# Patient Record
Sex: MALE | Race: WHITE | NOT HISPANIC OR LATINO | Employment: FULL TIME | URBAN - METROPOLITAN AREA
[De-identification: names, ages, dates, MRNs, and addresses within clinical notes are randomized per-mention and may not be internally consistent; named-entity substitution may affect disease eponyms.]

---

## 2022-06-09 ENCOUNTER — TELEMEDICINE (OUTPATIENT)
Dept: INTERNAL MEDICINE CLINIC | Age: 52
End: 2022-06-09
Payer: COMMERCIAL

## 2022-06-09 VITALS — BODY MASS INDEX: 33.13 KG/M2 | WEIGHT: 250 LBS | HEIGHT: 73 IN

## 2022-06-09 DIAGNOSIS — Z20.822 EXPOSURE TO COVID-19 VIRUS: ICD-10-CM

## 2022-06-09 DIAGNOSIS — R05.9 COUGH: Primary | ICD-10-CM

## 2022-06-09 PROCEDURE — U0003 INFECTIOUS AGENT DETECTION BY NUCLEIC ACID (DNA OR RNA); SEVERE ACUTE RESPIRATORY SYNDROME CORONAVIRUS 2 (SARS-COV-2) (CORONAVIRUS DISEASE [COVID-19]), AMPLIFIED PROBE TECHNIQUE, MAKING USE OF HIGH THROUGHPUT TECHNOLOGIES AS DESCRIBED BY CMS-2020-01-R: HCPCS | Performed by: PHYSICIAN ASSISTANT

## 2022-06-09 PROCEDURE — 99213 OFFICE O/P EST LOW 20 MIN: CPT | Performed by: PHYSICIAN ASSISTANT

## 2022-06-09 PROCEDURE — 3008F BODY MASS INDEX DOCD: CPT | Performed by: PHYSICIAN ASSISTANT

## 2022-06-09 PROCEDURE — 1036F TOBACCO NON-USER: CPT | Performed by: PHYSICIAN ASSISTANT

## 2022-06-09 PROCEDURE — U0005 INFEC AGEN DETEC AMPLI PROBE: HCPCS | Performed by: PHYSICIAN ASSISTANT

## 2022-06-09 RX ORDER — BENZONATATE 100 MG/1
100 CAPSULE ORAL 3 TIMES DAILY PRN
Qty: 20 CAPSULE | Refills: 0 | Status: SHIPPED | OUTPATIENT
Start: 2022-06-09

## 2022-06-09 RX ORDER — ALBUTEROL SULFATE 90 UG/1
2 AEROSOL, METERED RESPIRATORY (INHALATION) EVERY 6 HOURS PRN
Qty: 18 G | Refills: 0 | Status: SHIPPED | OUTPATIENT
Start: 2022-06-09

## 2022-06-09 NOTE — PROGRESS NOTES
COVID-19 Outpatient Progress Note    Assessment/Plan:    Problem List Items Addressed This Visit    None     Visit Diagnoses     Cough    -  Primary    albuterol prn, tessalon prn     Relevant Medications    benzonatate (TESSALON PERLES) 100 mg capsule    albuterol (ProAir HFA) 90 mcg/act inhaler    Exposure to COVID-19 virus        to go for covid testing asap  mask precautions  increase fluids    Relevant Medications    albuterol (ProAir HFA) 90 mcg/act inhaler    Other Relevant Orders    COVID Only- Collected at [facility] or [facility] (Completed)        wash hands liberally with soap and water for at least 20 seconds at a time and wear a face mask when in the presence of any other person  if symptoms persist or worsen pt should call the office or go to the ED for worsening SOB, chest pain, fever or an inability to tolerate oral intake      Disposition:     Referred patient to centralized site to test for COVID-19  Discussed symptom directed medication options with patient  Discussed vitamin D, vitamin C, and/or zinc supplementation with patient  Patient meets criteria for PAXLOVID and they have been counseled appropriately according to EUA documentation released by the FDA  After discussion, patient agrees to treatment  [address] is an investigational medicine used to treat mild-to-moderate COVID-19 in adults and children (15years of age and older weighing at least 80 pounds (40 kg)) with positive results of direct SARS-CoV-2 viral testing, and who are at high risk for progression to severe COVID-19, including hospitalization or death  PAXLOVID is investigational because it is still being studied  There is limited information about the safety and effectiveness of using PAXLOVID to treat people with mild-to-moderate COVID-19      The FDA has authorized the emergency use of PAXLOVID for the treatment of mild-tomoderate COVID-19 in adults and children (15years of age and older weighing at least 80 pounds (40 kg)) with a positive test for the virus that causes COVID-19, and who are at high risk for progression to severe COVID-19, including hospitalization or death, under an EUA  What should I tell my healthcare provider before I take PAXLOVID? Tell your healthcare provider if you:  - Have any allergies  - Have liver or kidney disease  - Are pregnant or plan to become pregnant  - Are breastfeeding a child  - Have any serious illnesses    Tell your healthcare provider about all the medicines you take, including prescription and over-the-counter medicines, vitamins, and herbal supplements  Some medicines may interact with PAXLOVID and may cause serious side effects  Keep a list of your medicines to show your healthcare provider and pharmacist when you get a new medicine  You can ask your healthcare provider or pharmacist for a list of medicines that interact with PAXLOVID  Do not start taking a new medicine without telling your healthcare provider  Your healthcare provider can tell you if it is safe to take PAXLOVID with other medicines  Tell your healthcare provider if you are taking combined hormonal contraceptive  PAXLOVID may affect how your birth control pills work  Females who are able to become pregnant should use another effective alternative form of contraception or an additional barrier method of contraception  Talk to your healthcare provider if you have any questions about contraceptive methods that might be right for you  How do I take PAXLOVID? PAXLOVID consists of 2 medicines: nirmatrelvir and ritonavir  - Take 2 pink tablets of nirmatrelvir with 1 white tablet of ritonavir by mouth 2 times each day (in the morning and in the evening) for 5 days  For each dose, take all 3 tablets at the same time  - If you have kidney disease, talk to your healthcare provider  You may need a different dose  - Swallow the tablets whole   Do not chew, break, or crush the tablets  - Take PAXLOVID with or without food  - Do not stop taking PAXLOVID without talking to your healthcare provider, even if you feel better  - If you miss a dose of PAXLOVID within 8 hours of the time it is usually taken, take it as soon as you remember  If you miss a dose by more than 8 hours, skip the missed dose and take the next dose at your regular time  Do not take 2 doses of PAXLOVID at the same time  - If you take too much PAXLOVID, call your healthcare provider or go to the nearest hospital emergency room right away  - If you are taking a ritonavir- or cobicistat-containing medicine to treat hepatitis C or Human Immunodeficiency Virus (HIV), you should continue to take your medicine as prescribed by your healthcare provider   - Talk to your healthcare provider if you do not feel better or if you feel worse after 5 days  Who should generally not take PAXLOVID? Do not take PAXLOVID if:  You are allergic to nirmatrelvir, ritonavir, or any of the ingredients in PAXLOVID  You are taking any of the following medicines:  - Alfuzosin  - Pethidine, piroxicam, propoxyphene  - Ranolazine  - Amiodarone, dronedarone, flecainide, propafenone, quinidine  - Colchicine  - Lurasidone, pimozide, clozapine  - Dihydroergotamine, ergotamine, methylergonovine  - Lovastatin, simvastatin  - Sildenafil (Revatio®) for pulmonary arterial hypertension (PAH)  - Triazolam, oral midazolam  - Apalutamide  - Carbamazepine, phenobarbital, phenytoin  - Rifampin  - St  Lams Wort (hypericum perforatum)    What are the important possible side effects of PAXLOVID? Possible side effects of PAXLOVID are:  - Liver Problems  Tell your healthcare provider right away if you have any of these signs and symptoms of liver problems: loss of appetite, yellowing of your skin and the whites of eyes (jaundice), dark-colored urine, pale colored stools and itchy skin, stomach area (abdominal) pain  - Resistance to HIV Medicines   If you have untreated HIV infection, PAXLOVID may lead to some HIV medicines not working as well in the future  - Other possible side effects include: altered sense of taste, diarrhea, high blood pressure, or muscle aches    These are not all the possible side effects of PAXLOVID  Not many people have taken PAXLOVID  Serious and unexpected side effects may happen  [address] is still being studied, so it is possible that all of the risks are not known at this time  What other treatment choices are there? Like Bhavana Fail may allow for the emergency use of other medicines to treat people with COVID-19  Go to https://[employer]/ for information on the emergency use of other medicines that are authorized by FDA to treat people with COVID-19  Your healthcare provider may talk with you about clinical trials for which you may be eligible  It is your choice to be treated or not to be treated with PAXLOVID  Should you decide not to receive it or for your child not to receive it, it will not change your standard medical care  What if I am pregnant or breastfeeding? There is no experience treating pregnant women or breastfeeding mothers with PAXLOVID  For a mother and unborn baby, the benefit of taking PAXLOVID may be greater than the risk from the treatment  If you are pregnant, discuss your options and specific situation with your healthcare provider  It is recommended that you use effective barrier contraception or do not have sexual activity while taking PAXLOVID  If you are breastfeeding, discuss your options and specific situation with your healthcare provider  How do I report side effects with PAXLOVID? Contact your healthcare provider if you have any side effects that bother you or do not go away      Report side effects to FDA MedWatch at www fda gov/medwatch or call 4-326-UBE9317 or you can report side effects to [employer]  at the contact information provided below  Website Fax number Telephone number   [employer] [phone] [phone]     How should I store [address]? Store PAXLOVID tablets at room temperature between 68°F to 77°F (20°C to 25°C)  Full fact sheet for patients, parents, and caregivers can be found at: [employer]    I have spent 12 minutes directly with the patient  Greater than 50% of this time was spent in counseling/coordination of care regarding: risks and benefits of treatment options, instructions for management and patient and family education  To go for covid testing asap         Encounter provider Feli Pozo PA-C    Provider located at [address] [id number]    Recent Visits  Date Type Provider Dept   06/09/22 Telemedicine Feli Pozo PA-C [id number] [facility] recent visits within past 7 days and meeting all other requirements  Today's Visits  Date Type Provider Dept   06/10/22 Telephone [facility]   Showing today's visits and meeting all other requirements  Future Appointments  No visits were found meeting these conditions  Showing future appointments within next 150 days and meeting all other requirements       Patient agrees to participate in a virtual check in via telephone or video visit instead of presenting to the office to address urgent/immediate medical needs  Patient is aware this is a billable service  After connecting through [facility], the patient was identified by name and date of birth  William Alfred was informed that this was a telemedicine visit and that the exam was being conducted confidentially over secure lines  My office door was closed  No one else was in the room   William Alfred acknowledged consent and understanding of privacy and security of the telemedicine visit  I informed the patient that I have reviewed his record in Epic and presented the opportunity for him to ask any questions regarding the visit today  The patient agreed to participate  Verification of patient location:  Patient is located in the following state in which I hold an active license: [place]    Subjective:   Radha Bain is a 54 y o  male who is concerned about COVID-19  Patient's symptoms include sore throat, cough, shortness of breath and chest tightness  Patient denies fever, chills, fatigue, malaise, congestion, rhinorrhea, anosmia, loss of taste, abdominal pain, nausea, vomiting, diarrhea, myalgias and headaches  - Date of symptom onset: 6/8/2022      COVID-19 vaccination status: Not vaccinated    Exposure:   Contact with a person who is under investigation (PUI) for or who is positive for COVID-19 within the last 14 days?: No    Hospitalized recently for fever and/or lower respiratory symptoms?: No      Currently a healthcare worker that is involved in direct patient care?: No      Works in a special setting where the risk of COVID-19 transmission may be high? (this may include long-term care, correctional and half-way facilities; homeless shelters; assisted-living facilities and group homes ): No      Resident in a special setting where the risk of COVID-19 transmission may be high? (this may include long-term care, correctional and half-way facilities; homeless shelters; assisted-living facilities and group homes ): No      Sx started this am suddenly, pt reports chest tightness, sob with exertion, dry cough, sore throat  No known contacts      Lab Results   Component Value Date    SARSCOV2 Positive (A) 06/09/2022    [facility] [address] [facility] Not Detected 09/07/2020     History reviewed  No pertinent past medical history    Past Surgical History:   Procedure Laterality Date    HERNIA REPAIR       Current Outpatient Medications   Medication Sig Dispense Refill    albuterol (ProAir HFA) 90 mcg/act inhaler Inhale 2 puffs every 6 (six) hours as needed for wheezing or shortness of breath 18 g 0    benzonatate (TESSALON PERLES) 100 mg capsule Take 1 capsule (100 mg total) by mouth 3 (three) times a day as needed for cough 20 capsule 0    nirmatrelvir & ritonavir (Paxlovid) tablet therapy pack Take 3 tablets by mouth 2 (two) times a day for 5 days Take 2 nirmatrelvir tablets + 1 ritonavir tablet together per dose 30 tablet 0     No current facility-administered medications for this visit  No Known Allergies    Review of Systems   Constitutional: Negative for chills, fatigue and fever  HENT: Positive for sore throat  Negative for congestion and rhinorrhea  Respiratory: Positive for cough, chest tightness and shortness of breath  Gastrointestinal: Negative for abdominal pain, diarrhea, nausea and vomiting  Musculoskeletal: Negative for myalgias  Neurological: Negative for headaches  Objective:    Vitals:    06/09/22 0702   Weight: 113 kg (250 lb)   Height: 6' 1" (1 854 m)       Physical Exam  Constitutional:       General: He is not in acute distress  Pulmonary:      Effort: Pulmonary effort is normal  No respiratory distress  Neurological:      General: No focal deficit present  Mental Status: He is alert  VIRTUAL VISIT DISCLAIMER    Vickie Hernandez verbally agrees to participate in [employer]  Pt is aware that [employer] could be limited without vital signs or the ability to perform a full hands-on physical Morteza Sanchez understands he or the provider may request at any time to terminate the video visit and request the patient to seek care or treatment in person

## 2022-06-10 ENCOUNTER — TELEPHONE (OUTPATIENT)
Dept: INTERNAL MEDICINE CLINIC | Age: 52
End: 2022-06-10

## 2022-06-10 DIAGNOSIS — U07.1 COVID-19: Primary | ICD-10-CM

## 2022-06-10 LAB — SARS-COV-2 RNA RESP QL NAA+PROBE: POSITIVE

## 2022-06-10 NOTE — TELEPHONE ENCOUNTER
Patient is calling to find out if we can send over the medication for him to take for COVID    His test results came back positive for COVID    Please send to [facility] in [place]

## 2022-08-31 ENCOUNTER — APPOINTMENT (OUTPATIENT)
Dept: RADIOLOGY | Facility: CLINIC | Age: 52
End: 2022-08-31
Payer: COMMERCIAL

## 2022-08-31 ENCOUNTER — OFFICE VISIT (OUTPATIENT)
Dept: INTERNAL MEDICINE CLINIC | Age: 52
End: 2022-08-31
Payer: COMMERCIAL

## 2022-08-31 VITALS
WEIGHT: 250 LBS | TEMPERATURE: 98.5 F | SYSTOLIC BLOOD PRESSURE: 140 MMHG | OXYGEN SATURATION: 98 % | HEIGHT: 73 IN | BODY MASS INDEX: 33.13 KG/M2 | DIASTOLIC BLOOD PRESSURE: 100 MMHG | HEART RATE: 100 BPM

## 2022-08-31 DIAGNOSIS — Z12.11 SCREEN FOR COLON CANCER: Primary | ICD-10-CM

## 2022-08-31 DIAGNOSIS — M25.562 ACUTE PAIN OF LEFT KNEE: ICD-10-CM

## 2022-08-31 PROCEDURE — 73562 X-RAY EXAM OF KNEE 3: CPT

## 2022-08-31 PROCEDURE — 99202 OFFICE O/P NEW SF 15 MIN: CPT | Performed by: NURSE PRACTITIONER

## 2022-08-31 RX ORDER — MELOXICAM 15 MG/1
15 TABLET ORAL DAILY
Qty: 30 TABLET | Refills: 0 | Status: SHIPPED | OUTPATIENT
Start: 2022-08-31

## 2022-08-31 RX ORDER — TRAMADOL HYDROCHLORIDE 50 MG/1
50 TABLET ORAL
Qty: 7 TABLET | Refills: 0 | Status: SHIPPED | OUTPATIENT
Start: 2022-08-31

## 2022-08-31 NOTE — PROGRESS NOTES
Assessment/Plan:    Acute pain of left knee  Will start mobic, advised not to take with other NSAIDs  Tramadol for severe pain  Will get x-rays of bilateral knees  Referral given to orthopedics  May need PT  Diagnoses and all orders for this visit:    Screen for colon cancer  -     Cologuard    Acute pain of left knee  -     XR knee 3 vw left non injury; Future  -     XR knee 3 vw right non injury; Future  -     traMADol (Ultram) 50 mg tablet; Take 1 tablet (50 mg total) by mouth daily at bedtime as needed for severe pain  -     meloxicam (Mobic) 15 mg tablet; Take 1 tablet (15 mg total) by mouth daily  -     Ambulatory Referral to Orthopedic Surgery; Future          Subjective:      Patient ID: Yolanda Rooney is a 54 y o  male  Patient presents today with significant right knee pain, denies injury  Has been going on for about a month  He is very active   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  He reports that he wakes up a night from the pain at times  Also now having left knee pain from over compensating     Knee Pain   There was no injury mechanism  The pain is at a severity of 10/10  The pain has been intermittent since onset  Associated symptoms include numbness and tingling  Pertinent negatives include no inability to bear weight or muscle weakness  He has tried NSAIDs (bio freeze, voltaren gel ) for the symptoms  The treatment provided no relief  The following portions of the patient's history were reviewed and updated as appropriate: allergies, current medications, past family history, past medical history, past social history, past surgical history and problem list     Review of Systems   Constitutional: Negative for activity change, appetite change, chills, diaphoresis and fever  HENT: Negative for congestion, ear discharge, ear pain, postnasal drip, rhinorrhea, sinus pressure, sinus pain and sore throat      Eyes: Negative for pain, discharge, itching and visual disturbance  Respiratory: Negative for cough, chest tightness, shortness of breath and wheezing  Cardiovascular: Negative for chest pain, palpitations and leg swelling  Gastrointestinal: Negative for abdominal pain, constipation, diarrhea, nausea and vomiting  Endocrine: Negative for polydipsia, polyphagia and polyuria  Genitourinary: Negative for difficulty urinating, dysuria and urgency  Musculoskeletal: Positive for arthralgias  Negative for back pain and neck pain  Skin: Negative for rash and wound  Neurological: Positive for tingling and numbness  Negative for dizziness, weakness and headaches  Past Medical History:   Diagnosis Date    Hypertension          Current Outpatient Medications:     albuterol (ProAir HFA) 90 mcg/act inhaler, Inhale 2 puffs every 6 (six) hours as needed for wheezing or shortness of breath, Disp: 18 g, Rfl: 0    meloxicam (Mobic) 15 mg tablet, Take 1 tablet (15 mg total) by mouth daily, Disp: 30 tablet, Rfl: 0    traMADol (Ultram) 50 mg tablet, Take 1 tablet (50 mg total) by mouth daily at bedtime as needed for severe pain, Disp: 7 tablet, Rfl: 0    benzonatate (TESSALON PERLES) 100 mg capsule, Take 1 capsule (100 mg total) by mouth 3 (three) times a day as needed for cough (Patient not taking: Reported on 8/31/2022), Disp: 20 capsule, Rfl: 0    No Known Allergies    Social History   Past Surgical History:   Procedure Laterality Date    HERNIA REPAIR       Family History   Problem Relation Age of Onset    COPD Mother     Hypertension Father     Diabetes Father     Lung cancer Father        Objective:  /100 (BP Location: Left arm, Patient Position: Sitting, Cuff Size: Large)   Pulse 100   Temp 98 5 °F (36 9 °C) (Temporal)   Ht 6' 1" (1 854 m)   Wt 113 kg (250 lb)   SpO2 98%   BMI 32 98 kg/m²     No results found for this or any previous visit (from the past 1344 hour(s))           Physical Exam  Constitutional:       General: He is not in acute distress  Appearance: He is well-developed  He is not diaphoretic  HENT:      Head: Normocephalic and atraumatic  Right Ear: External ear normal       Left Ear: External ear normal       Nose: Nose normal       Mouth/Throat:      Pharynx: No oropharyngeal exudate  Eyes:      General:         Right eye: No discharge  Left eye: No discharge  Conjunctiva/sclera: Conjunctivae normal       Pupils: Pupils are equal, round, and reactive to light  Neck:      Thyroid: No thyromegaly  Cardiovascular:      Rate and Rhythm: Normal rate and regular rhythm  Heart sounds: Normal heart sounds  No murmur heard  No friction rub  No gallop  Pulmonary:      Effort: Pulmonary effort is normal  No respiratory distress  Breath sounds: Normal breath sounds  No stridor  No wheezing or rales  Abdominal:      General: Bowel sounds are normal  There is no distension  Palpations: Abdomen is soft  Tenderness: There is no abdominal tenderness  Musculoskeletal:      Cervical back: Normal range of motion and neck supple  Right knee: Swelling present  No erythema or crepitus  Normal range of motion  Tenderness present  Instability Tests: Anterior drawer test negative  Legs:    Lymphadenopathy:      Cervical: No cervical adenopathy  Skin:     General: Skin is warm and dry  Findings: No erythema or rash  Neurological:      Mental Status: He is alert and oriented to person, place, and time  Psychiatric:         Behavior: Behavior normal          Thought Content:  Thought content normal          Judgment: Judgment normal

## 2022-08-31 NOTE — ASSESSMENT & PLAN NOTE
Will start mobic, advised not to take with other NSAIDs  Tramadol for severe pain  Will get x-rays of bilateral knees  Referral given to orthopedics  May need PT

## 2022-09-12 ENCOUNTER — OFFICE VISIT (OUTPATIENT)
Dept: OBGYN CLINIC | Facility: CLINIC | Age: 52
End: 2022-09-12
Payer: COMMERCIAL

## 2022-09-12 VITALS
WEIGHT: 250 LBS | DIASTOLIC BLOOD PRESSURE: 89 MMHG | SYSTOLIC BLOOD PRESSURE: 130 MMHG | HEIGHT: 73 IN | BODY MASS INDEX: 33.13 KG/M2

## 2022-09-12 DIAGNOSIS — M25.562 CHRONIC PAIN OF BOTH KNEES: Primary | ICD-10-CM

## 2022-09-12 DIAGNOSIS — M25.561 CHRONIC PAIN OF BOTH KNEES: Primary | ICD-10-CM

## 2022-09-12 DIAGNOSIS — M25.461 EFFUSION OF BOTH KNEE JOINTS: ICD-10-CM

## 2022-09-12 DIAGNOSIS — M25.462 EFFUSION OF BOTH KNEE JOINTS: ICD-10-CM

## 2022-09-12 DIAGNOSIS — G89.29 CHRONIC PAIN OF BOTH KNEES: Primary | ICD-10-CM
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
## 2022-09-12 DIAGNOSIS — S70.361A TICK BITE OF RIGHT THIGH, INITIAL ENCOUNTER: ICD-10-CM

## 2022-09-12 DIAGNOSIS — M17.0 PRIMARY OSTEOARTHRITIS OF BOTH KNEES: ICD-10-CM

## 2022-09-12 DIAGNOSIS — M25.562 ACUTE PAIN OF LEFT KNEE: ICD-10-CM

## 2022-09-12 LAB — CRYSTALS SNV QL MICRO: NORMAL

## 2022-09-12 PROCEDURE — 87476 LYME DIS DNA AMP PROBE: CPT | Performed by: EMERGENCY MEDICINE

## 2022-09-12 PROCEDURE — 99204 OFFICE O/P NEW MOD 45 MIN: CPT | Performed by: EMERGENCY MEDICINE

## 2022-09-12 PROCEDURE — 87205 SMEAR GRAM STAIN: CPT | Performed by: EMERGENCY MEDICINE

## 2022-09-12 PROCEDURE — 87070 CULTURE OTHR SPECIMN AEROBIC: CPT | Performed by: EMERGENCY MEDICINE

## 2022-09-12 PROCEDURE — 89051 BODY FLUID CELL COUNT: CPT | Performed by: EMERGENCY MEDICINE

## 2022-09-12 PROCEDURE — 20610 DRAIN/INJ JOINT/BURSA W/O US: CPT | Performed by: EMERGENCY MEDICINE

## 2022-09-12 PROCEDURE — 89060 EXAM SYNOVIAL FLUID CRYSTALS: CPT | Performed by: EMERGENCY MEDICINE

## 2022-09-12 RX ORDER — BUPIVACAINE HYDROCHLORIDE 5 MG/ML
3.5 INJECTION, SOLUTION PERINEURAL
Status: COMPLETED | OUTPATIENT
Start: 2022-09-12 | End: 2022-09-12

## 2022-09-12 RX ADMIN — BUPIVACAINE HYDROCHLORIDE 3.5 ML: 5 INJECTION, SOLUTION PERINEURAL at 16:16

## 2022-09-12 NOTE — PROGRESS NOTES
Assessment/Plan:    Diagnoses and all orders for this visit:    Chronic pain of both knees  -     Body fluid culture and Gram stain; Future  -     Synovial fluid white cell count w/ diff; Future  -     Synovial fluid, crystal; Future  -     Lyme disease, PCR; Future  -     Large joint arthrocentesis: R knee    Primary osteoarthritis of both knees  -     Body fluid culture and Gram stain; Future  -     Synovial fluid white cell count w/ diff; Future  -     Synovial fluid, crystal; Future  -     Lyme disease, PCR; Future  -     Large joint arthrocentesis: R knee    Effusion of both knee joints  -     Body fluid culture and Gram stain; Future  -     Synovial fluid white cell count w/ diff; Future  -     Synovial fluid, crystal; Future  -     Lyme disease, PCR; Future  -     Large joint arthrocentesis: R knee    Tick bite, unspecified site, initial encounter  -     Body fluid culture and Gram stain; Future  -     Synovial fluid white cell count w/ diff; Future  -     Synovial fluid, crystal; Future  -     Lyme disease, PCR; Future  -     Large joint arthrocentesis: R knee    Given slightly cloudy synovial fluid appearance, will perform synovial evaluation  If wnl will provide CSI, given arthritic changes seen on Xrays  Continue Mobic  Work excuse note until further notice    Return for as scheduled next week  Chief Complaint:     Chief Complaint   Patient presents with    Left Knee - Pain    Right Knee - Pain       Subjective:   Patient ID: Sumaya He is a 54 y o  male  NP presents for Right medial and anterior/infrapatellar knee pain x 1-2 months, denies injury but does have hx of tick bites and alternating pain and swelling b/l knees  Recent Xrays obtained recently   Denies F/C, taking Mobic        Review of Systems    The following portions of the patient's chart were reviewed and updated as appropriate:    Allergy:  No Known Allergies      Past Medical History:   Diagnosis Date    Hypertension Past Surgical History:   Procedure Laterality Date    HERNIA REPAIR         Social History     Socioeconomic History    Marital status: Single     Spouse name: Not on file    Number of children: Not on file    Years of education: Not on file    Highest education level: Not on file   Occupational History    Not on file   Tobacco Use    Smoking status: Never Smoker    Smokeless tobacco: Current User     Types: Snuff, Chew   Vaping Use    Vaping Use: Never used   Substance and Sexual Activity    Alcohol use: Yes     Comment: socal    Drug use: Never    Sexual activity: Not on file   Other Topics Concern    Not on file   Social History Narrative    Not on file     Social Determinants of Health     Financial Resource Strain: Not on file   Food Insecurity: Not on file   Transportation Needs: Not on file   Physical Activity: Not on file   Stress: Not on file   Social Connections: Not on file   Intimate Partner Violence: Not on file   Housing Stability: Not on file       Family History   Problem Relation Age of Onset    COPD Mother     Hypertension Father     Diabetes Father     Lung cancer Father        Medications:    Current Outpatient Medications:     albuterol (ProAir HFA) 90 mcg/act inhaler, Inhale 2 puffs every 6 (six) hours as needed for wheezing or shortness of breath, Disp: 18 g, Rfl: 0    benzonatate (TESSALON PERLES) 100 mg capsule, Take 1 capsule (100 mg total) by mouth 3 (three) times a day as needed for cough (Patient not taking: Reported on 8/31/2022), Disp: 20 capsule, Rfl: 0    meloxicam (Mobic) 15 mg tablet, Take 1 tablet (15 mg total) by mouth daily, Disp: 30 tablet, Rfl: 0    traMADol (Ultram) 50 mg tablet, Take 1 tablet (50 mg total) by mouth daily at bedtime as needed for severe pain, Disp: 7 tablet, Rfl: 0    Patient Active Problem List   Diagnosis    Acute pain of left knee       Objective:  /89   Ht 6' 1" (1 854 m)   Wt 113 kg (250 lb)   BMI 32 98 kg/m² Right Knee Exam     Other   Erythema: absent  Sensation: normal  Swelling: moderate  Effusion: effusion present      Left Knee Exam     Other   Erythema: absent          Observations     Right Knee   Positive for effusion  Physical Exam  Musculoskeletal:      Right knee: Effusion present  Neurologic Exam    Large joint arthrocentesis: R knee  Universal Protocol:  Consent: Verbal consent obtained  Risks and benefits: risks, benefits and alternatives were discussed  Consent given by: patient  Time out: Immediately prior to procedure a "time out" was called to verify the correct patient, procedure, equipment, support staff and site/side marked as required  Timeout called at: 9/12/2022 4:15 PM   Patient understanding: patient states understanding of the procedure being performed  Test results: test results available and properly labeled  Site marked: the operative site was marked  Patient identity confirmed: verbally with patient    Supporting Documentation  Indications: pain   Procedure Details  Location: knee - R knee  Preparation: Patient was prepped and draped in the usual sterile fashion  Needle size: 18 G  Ultrasound guidance: no  Approach: lateral  Medications administered: 3 5 mL bupivacaine 0 5 %    Aspirate amount: 20 mL  Aspirate: yellow and blood-tinged  Analysis: fluid sample sent for laboratory analysis    Patient tolerance: patient tolerated the procedure well with no immediate complications  Dressing:  Sterile dressing applied    No erythema of knees  Slightly cloudy          I have personally reviewed pertinent films in PACS  and I have personally reviewed the written report of the pertinent studies     Mild to moderate degenerative changes medial compartment L>R with + effusions, no acute fractures or dislocations staff and site/side marked as required  Timeout called at: 9/12/2022 4:15 PM   Patient understanding: patient states understanding of the procedure being performed  Test results: test results available and properly labeled  Site marked: the operative site was marked  Patient identity confirmed: verbally with patient    Supporting Documentation  Indications: pain   Procedure Details  Location: knee - R knee  Preparation: Patient was prepped and draped in the usual sterile fashion  Needle size: 18 G  Ultrasound guidance: no  Approach: lateral  Medications administered: 3 5 mL bupivacaine 0 5 %    Aspirate amount: 20 mL  Aspirate: yellow and blood-tinged  Analysis: fluid sample sent for laboratory analysis    Patient tolerance: patient tolerated the procedure well with no immediate complications  Dressing:  Sterile dressing applied    No erythema of knees  Slightly cloudy          I have personally reviewed pertinent films in PACS  and I have personally reviewed the written report of the pertinent studies     Mild to moderate degenerative changes medial compartment L>R with + effusions, no acute fractures or dislocations

## 2022-09-12 NOTE — LETTER
September 12, 2022     Patient: Clay Gonzalez  YOB: 1970  Date of Visit: 9/12/2022      To Whom it May Concern:    Nancy Ott is under my professional care  Padmaja Shelby was seen in my office on 9/12/2022  Work excuse until further notice  F/U 1 week    If you have any questions or concerns, please don't hesitate to call           Sincerely,          Mardelle Nageotte, MD        CC: No Recipients

## 2022-09-12 NOTE — PATIENT INSTRUCTIONS
While taking meloxicam do not take any other NSAIDs such as Advil, Motrin, ibuprofen, Celebrex, naproxen or Aleve  However you may take Tylenol    You may also take Tylenol 500mg every 4-6 hours as needed OR max 1,000mg per dose up to 3 times per day for a total of 3,000mg per day

## 2022-09-13 LAB
APPEARANCE FLD: ABNORMAL
COLOR FLD: YELLOW
EOSINOPHIL NFR SNV MANUAL: 1 %
LYMPHOCYTES # SNV MANUAL: 14 %
MONOCYTES NFR SNV MANUAL: 83 %
NEUTROPHILS NFR SNV MANUAL: 2 %
SITE: ABNORMAL
TOTAL CELLS COUNTED SPEC: 100
WBC # FLD MANUAL: 226 /UL (ref 0–200)

## 2022-09-15 ENCOUNTER — OFFICE VISIT (OUTPATIENT)
Dept: INTERNAL MEDICINE CLINIC | Age: 52
End: 2022-09-15
Payer: COMMERCIAL

## 2022-09-15 VITALS
HEART RATE: 110 BPM | HEIGHT: 73 IN | SYSTOLIC BLOOD PRESSURE: 148 MMHG | WEIGHT: 252 LBS | TEMPERATURE: 97.9 F | BODY MASS INDEX: 33.4 KG/M2 | OXYGEN SATURATION: 98 % | DIASTOLIC BLOOD PRESSURE: 78 MMHG

## 2022-09-15 DIAGNOSIS — F17.229 CHEWING TOBACCO NICOTINE DEPENDENCE WITH NICOTINE-INDUCED DISORDER: Primary | ICD-10-CM

## 2022-09-15 DIAGNOSIS — H61.23 CERUMEN DEBRIS ON TYMPANIC MEMBRANE OF BOTH EARS: ICD-10-CM

## 2022-09-15 DIAGNOSIS — M25.50 ARTHRALGIA, UNSPECIFIED JOINT: ICD-10-CM

## 2022-09-15 DIAGNOSIS — R53.83 FATIGUE, UNSPECIFIED TYPE: ICD-10-CM

## 2022-09-15 DIAGNOSIS — Z13.228 SCREENING FOR METABOLIC DISORDER: ICD-10-CM

## 2022-09-15 DIAGNOSIS — Z11.59 ENCOUNTER FOR HEPATITIS C SCREENING TEST FOR LOW RISK PATIENT: ICD-10-CM

## 2022-09-15 DIAGNOSIS — Z12.5 PROSTATE CANCER SCREENING: ICD-10-CM

## 2022-09-15 DIAGNOSIS — L30.9 DERMATITIS: ICD-10-CM

## 2022-09-15 PROBLEM — F11.20 CONTINUOUS OPIOID DEPENDENCE (HCC): Status: ACTIVE | Noted: 2022-09-15

## 2022-09-15 PROCEDURE — 69210 REMOVE IMPACTED EAR WAX UNI: CPT | Performed by: PHYSICIAN ASSISTANT

## 2022-09-15 PROCEDURE — 99214 OFFICE O/P EST MOD 30 MIN: CPT | Performed by: PHYSICIAN ASSISTANT

## 2022-09-15 RX ORDER — BUPROPION HYDROCHLORIDE 150 MG/1
150 TABLET, EXTENDED RELEASE ORAL 2 TIMES DAILY
Qty: 60 TABLET | Refills: 2 | Status: SHIPPED | OUTPATIENT
Start: 2022-09-15

## 2022-09-15 NOTE — PATIENT INSTRUCTIONS
Fasting labs to be done after birthday    Check BP at home, send via [facility] wellbutrin 1 tab in am for 1 week then increase to twice daily (am and dinner time)

## 2022-09-15 NOTE — PROGRESS NOTES
Assessment/Plan:         Diagnoses and all orders for this visit:    Chewing tobacco nicotine dependence with nicotine-induced disorder  Comments:  wellbutrin 150mg daily x 1 week then increase to bid  discussed gum chewing alternative  up to date with dental exams and no lesions   Orders:  -     buPROPion (Wellbutrin SR) 150 mg 12 hr tablet; Take 1 tablet (150 mg total) by mouth 2 (two) times a day    Dermatitis  Comments:  consider eval for possible psoriasis  triamcinolone bid x 1 week   Orders:  -     triamcinolone (KENALOG) 0 1 % ointment; Apply topically 2 (two) times a day  -     Lyme Total Antibody Profile with reflex to WB; Future  -     TSH, 3rd generation; Future  -     Uric acid; Future    Arthralgia, unspecified joint  Comments:  f/u with ortho next week   Orders:  -     CBC and differential; Future  -     Comprehensive metabolic panel; Future  -     Lyme Total Antibody Profile with reflex to WB; Future  -     TSH, 3rd generation; Future  -     Uric acid; Future    Screening for metabolic disorder  -     Lipid panel; Future    Fatigue, unspecified type  -     CBC and differential; Future  -     Comprehensive metabolic panel; Future  -     Lyme Total Antibody Profile with reflex to WB; Future  -     TSH, 3rd generation; Future  -     Uric acid; Future    Encounter for hepatitis C screening test for low risk patient  -     Hepatitis C antibody; Future    Prostate cancer screening  -     PSA, Total Screen; Future    Cerumen debris on tympanic membrane of both ears    Other orders  -     Ear cerumen removal        BMI Counseling: Body mass index is 33 25 kg/m²  The BMI is above normal  Nutrition recommendations include encouraging healthy choices of fruits and vegetables, moderation in carbohydrate intake and increasing intake of lean protein  Exercise recommendations include exercising 3-5 times per week  Rationale for BMI follow-up plan is due to patient being overweight or obese           Subjective: Patient ID: Yassine Julio is a 54 y o  male  53 y/o male presents for establish care visit  bp slightly elevated today  States in past he was on lisinopril for BP but stopped on his own   Pt denies caffiene use    Pt with hx of nicotine dependence - chewing tobacco since late teen  Has not tried any cessation products in past  Reg dental visits, denies lesions in past    C/o rash on abdomen - noted 1 week ago, denies itching  +fam hx psoriasis  - father  No other rashes    Cerumen b/l - decreased hearing     R knee pain improved - has f/u with ortho  cx negative, crystals neg, lyme pending  No recent labs      The following portions of the patient's history were reviewed and updated as appropriate: allergies, current medications, past family history, past medical history, past social history, past surgical history and problem list     Review of Systems   Constitutional: Negative for activity change, appetite change, chills, diaphoresis and fever  HENT: Positive for hearing loss  Negative for congestion and dental problem  Eyes: Negative for pain and redness  Respiratory: Negative for cough, shortness of breath and wheezing  Cardiovascular: Negative for chest pain, palpitations and leg swelling  Gastrointestinal: Negative for abdominal pain, constipation, diarrhea and nausea  Genitourinary: Negative for dysuria and frequency  Musculoskeletal: Positive for arthralgias (R knee )  Skin: Positive for rash  Neurological: Negative for dizziness, weakness and headaches  Psychiatric/Behavioral: Negative for sleep disturbance  The patient is not nervous/anxious            Past Medical History:   Diagnosis Date    Hypertension          Current Outpatient Medications:     buPROPion (Wellbutrin SR) 150 mg 12 hr tablet, Take 1 tablet (150 mg total) by mouth 2 (two) times a day, Disp: 60 tablet, Rfl: 2    meloxicam (Mobic) 15 mg tablet, Take 1 tablet (15 mg total) by mouth daily, Disp: 30 tablet, Rfl: 0    triamcinolone (KENALOG) 0 1 % ointment, Apply topically 2 (two) times a day, Disp: 80 g, Rfl: 0    albuterol (ProAir HFA) 90 mcg/act inhaler, Inhale 2 puffs every 6 (six) hours as needed for wheezing or shortness of breath (Patient not taking: Reported on 9/15/2022), Disp: 18 g, Rfl: 0    benzonatate (TESSALON PERLES) 100 mg capsule, Take 1 capsule (100 mg total) by mouth 3 (three) times a day as needed for cough (Patient not taking: No sig reported), Disp: 20 capsule, Rfl: 0    traMADol (Ultram) 50 mg tablet, Take 1 tablet (50 mg total) by mouth daily at bedtime as needed for severe pain (Patient not taking: Reported on 9/15/2022), Disp: 7 tablet, Rfl: 0    No Known Allergies    Social History   Past Surgical History:   Procedure Laterality Date    HERNIA REPAIR       Family History   Problem Relation Age of Onset    COPD Mother     Hypertension Father     Diabetes Father     Lung cancer Father        Objective:  /78 (BP Location: Left arm, Patient Position: Sitting, Cuff Size: Standard)   Pulse (!) 110   Temp 97 9 °F (36 6 °C) (Temporal)   Ht 6' 1" (1 854 m)   Wt 114 kg (252 lb)   SpO2 98%   BMI 33 25 kg/m²        Physical Exam  Vitals reviewed  Constitutional:       General: He is not in acute distress  HENT:      Head: Normocephalic and atraumatic  Right Ear: Tympanic membrane, ear canal and external ear normal  There is impacted cerumen  Left Ear: Tympanic membrane, ear canal and external ear normal  There is impacted cerumen  Eyes:      General:         Right eye: No discharge  Left eye: No discharge  Extraocular Movements: Extraocular movements intact  Conjunctiva/sclera: Conjunctivae normal       Pupils: Pupils are equal, round, and reactive to light  Cardiovascular:      Rate and Rhythm: Normal rate and regular rhythm  Pulmonary:      Effort: Pulmonary effort is normal  No respiratory distress  Breath sounds: Normal breath sounds   No wheezing or rales  Abdominal:      General: Bowel sounds are normal  There is no distension  Hernia: A hernia (umbilical ) is present  Comments: Ventral umbilical hernia  No pain on palpation  Reducible    Musculoskeletal:         General: Normal range of motion  Cervical back: Normal range of motion  Right lower leg: No edema  Left lower leg: No edema  Lymphadenopathy:      Cervical: No cervical adenopathy  Skin:     Findings: Rash (on abdomen - silver scales, dry patch approx 3 inch) present  Neurological:      General: No focal deficit present  Mental Status: He is alert and oriented to person, place, and time  Psychiatric:         Mood and Affect: Mood normal          Behavior: Behavior normal          Ear cerumen removal    Date/Time: 9/15/2022 7:33 PM  Performed by: Breanna Burrows PA-C  Authorized by: Breanna Burrows PA-C   Universal Protocol:  Consent: Verbal consent obtained  Risks and benefits: risks, benefits and alternatives were discussed  Consent given by: patient  Timeout called at: 9/15/2022 7:00 PM     Patient location:  Clinic  Procedure details:     Location:  L ear    Procedure type: irrigation with instrumentation      Instrumentation: curette    Post-procedure details:     Complication:  None    Hearing quality:  Improved    Patient tolerance of procedure:   Tolerated well, no immediate complications

## 2022-09-16 LAB
BACTERIA SPEC BFLD CULT: NO GROWTH
GRAM STN SPEC: NORMAL

## 2022-09-19 ENCOUNTER — OFFICE VISIT (OUTPATIENT)
Dept: OBGYN CLINIC | Facility: CLINIC | Age: 52
End: 2022-09-19
Payer: COMMERCIAL

## 2022-09-19 VITALS — DIASTOLIC BLOOD PRESSURE: 100 MMHG | WEIGHT: 252 LBS | SYSTOLIC BLOOD PRESSURE: 148 MMHG | BODY MASS INDEX: 33.25 KG/M2

## 2022-09-19 DIAGNOSIS — M17.0 PRIMARY OSTEOARTHRITIS OF BOTH KNEES: ICD-10-CM

## 2022-09-19 DIAGNOSIS — M25.561 CHRONIC PAIN OF BOTH KNEES: Primary | ICD-10-CM

## 2022-09-19 DIAGNOSIS — M25.461 EFFUSION OF BOTH KNEE JOINTS: ICD-10-CM

## 2022-09-19 DIAGNOSIS — G89.29 CHRONIC PAIN OF BOTH KNEES: Primary | ICD-10-CM

## 2022-09-19 DIAGNOSIS — M25.462 EFFUSION OF BOTH KNEE JOINTS: ICD-10-CM

## 2022-09-19 DIAGNOSIS — M25.562 CHRONIC PAIN OF BOTH KNEES: Primary | ICD-10-CM

## 2022-09-19 LAB — B BURGDOR DNA SPEC QL NAA+PROBE: NEGATIVE

## 2022-09-19 PROCEDURE — 20610 DRAIN/INJ JOINT/BURSA W/O US: CPT | Performed by: EMERGENCY MEDICINE

## 2022-09-19 PROCEDURE — 99213 OFFICE O/P EST LOW 20 MIN: CPT | Performed by: EMERGENCY MEDICINE

## 2022-09-19 RX ORDER — BUPIVACAINE HYDROCHLORIDE 5 MG/ML
3.5 INJECTION, SOLUTION PERINEURAL
Status: COMPLETED | OUTPATIENT
Start: 2022-09-19 | End: 2022-09-19

## 2022-09-19 RX ORDER — TRIAMCINOLONE ACETONIDE 40 MG/ML
40 INJECTION, SUSPENSION INTRA-ARTICULAR; INTRAMUSCULAR
Status: COMPLETED | OUTPATIENT
Start: 2022-09-19 | End: 2022-09-19

## 2022-09-19 RX ADMIN — TRIAMCINOLONE ACETONIDE 40 MG: 40 INJECTION, SUSPENSION INTRA-ARTICULAR; INTRAMUSCULAR at 18:00

## 2022-09-19 RX ADMIN — BUPIVACAINE HYDROCHLORIDE 3.5 ML: 5 INJECTION, SOLUTION PERINEURAL at 18:00

## 2022-09-19 NOTE — PROGRESS NOTES
Assessment/Plan:    Diagnoses and all orders for this visit:    Chronic pain of both knees  -     Large joint arthrocentesis: R knee    Primary osteoarthritis of both knees  -     Large joint arthrocentesis: R knee    Effusion of both knee joints  -     Large joint arthrocentesis: R knee    Discussed lab work  Lyme PCR pending, discussed risks of CSI patient wished to proceed  If no improvement t/c MRI Knee    Return in about 4 weeks (around 10/17/2022)  Chief Complaint:     Chief Complaint   Patient presents with    Right Knee - Follow-up, Pain       Subjective:   Patient ID: Nolon Meckel is a 54 y o  male  Patient returns to review synovial fluid analysis and t/c CSI  Symptoms are stable      Review of Systems    The following portions of the patient's chart were reviewed and updated as appropriate:    Allergy:  No Known Allergies      Past Medical History:   Diagnosis Date    Hypertension        Past Surgical History:   Procedure Laterality Date    HERNIA REPAIR         Social History     Socioeconomic History    Marital status: Single     Spouse name: Not on file    Number of children: Not on file    Years of education: Not on file    Highest education level: Not on file   Occupational History    Not on file   Tobacco Use    Smoking status: Never Smoker    Smokeless tobacco: Current User     Types: Chew   Vaping Use    Vaping Use: Never used   Substance and Sexual Activity    Alcohol use: Yes     Comment: socal    Drug use: Never    Sexual activity: Not on file   Other Topics Concern    Not on file   Social History Narrative    Not on file     Social Determinants of Health     Financial Resource Strain: Not on file   Food Insecurity: Not on file   Transportation Needs: Not on file   Physical Activity: Not on file   Stress: Not on file   Social Connections: Not on file   Intimate Partner Violence: Not on file   Housing Stability: Not on file       Family History   Problem Relation Age of Onset   Ange Hughes COPD Mother     Hypertension Father     Diabetes Father     Lung cancer Father        Medications:    Current Outpatient Medications:     buPROPion (Wellbutrin SR) 150 mg 12 hr tablet, Take 1 tablet (150 mg total) by mouth 2 (two) times a day, Disp: 60 tablet, Rfl: 2    meloxicam (Mobic) 15 mg tablet, Take 1 tablet (15 mg total) by mouth daily, Disp: 30 tablet, Rfl: 0    triamcinolone (KENALOG) 0 1 % ointment, Apply topically 2 (two) times a day, Disp: 80 g, Rfl: 0    albuterol (ProAir HFA) 90 mcg/act inhaler, Inhale 2 puffs every 6 (six) hours as needed for wheezing or shortness of breath (Patient not taking: No sig reported), Disp: 18 g, Rfl: 0    benzonatate (TESSALON PERLES) 100 mg capsule, Take 1 capsule (100 mg total) by mouth 3 (three) times a day as needed for cough (Patient not taking: No sig reported), Disp: 20 capsule, Rfl: 0    traMADol (Ultram) 50 mg tablet, Take 1 tablet (50 mg total) by mouth daily at bedtime as needed for severe pain (Patient not taking: No sig reported), Disp: 7 tablet, Rfl: 0    Patient Active Problem List   Diagnosis    Acute pain of left knee    Continuous opioid dependence (HCC)       Objective:  /100   Wt 114 kg (252 lb)   BMI 33 25 kg/m²     Right Knee Exam     Other   Erythema: absent            Physical Exam      Neurologic Exam    Large joint arthrocentesis: R knee  Universal Protocol:  Consent: Verbal consent obtained  Risks and benefits: risks, benefits and alternatives were discussed  Consent given by: patient  Time out: Immediately prior to procedure a "time out" was called to verify the correct patient, procedure, equipment, support staff and site/side marked as required    Timeout called at: 9/19/2022 5:59 PM   Patient understanding: patient states understanding of the procedure being performed  Test results: test results available and properly labeled  Site marked: the operative site was marked  Patient identity confirmed: verbally with patient    Supporting Documentation  Indications: pain   Procedure Details  Location: knee - R knee  Preparation: Patient was prepped and draped in the usual sterile fashion  Needle size: 22 G  Ultrasound guidance: no  Approach: anterolateral  Medications administered: 40 mg triamcinolone acetonide 40 mg/mL; 3 5 mL bupivacaine 0 5 %    Patient tolerance: patient tolerated the procedure well with no immediate complications  Dressing:  Sterile dressing applied    No erythema of knees          I have personally reviewed the written report of the pertinent studies     Lyme PCR pending, otherwise synovial fluid Negative

## 2022-09-19 NOTE — PATIENT INSTRUCTIONS
Steroid Joint Injection   AMBULATORY CARE:   What you need to know about steroid joint injection:  A steroid joint injection is a procedure to inject steroid medicine into a joint  Steroid medicine decreases pain and inflammation  The injection may also contain an anesthetic (numbing medicine) to decrease pain  It may be done to treat conditions such as arthritis, gout, or carpal tunnel syndrome  The injections may be given in your knee, ankle, shoulder, elbow, or wrist  Injections may also be given in your hip, toe, thumb, or finger  How to prepare for steroid joint injection:  Your healthcare provider will talk to you about how to prepare for this procedure  He will tell you what medicines to take or not take on the day of your procedure  You may need to stop taking blood thinners several days before your procedure  What will happen during steroid joint injection:  You may be given local anesthesia to numb the area where the injection will be given  With local anesthesia, you may still feel pressure during the procedure, but you should not feel any pain  Your healthcare provider may use ultrasound or fluoroscopy (a type of x-ray) to guide the needle to the right area  He will then inject the steroid into your joint  A bandage will be placed on the injection site  What will happen after steroid joint injection:  You may have redness, warmth, or sweating in your face and chest right after the steroid injection  Steroids can affect blood sugar levels  If you have diabetes, you should check your blood sugars closely in the first 24 hours after your procedure  Risks of steroid joint injection:  You may get an infection in your joint  The injection may also cause more pain during the first 24 to 36 hours  You may need more than one injection to feel pain relief  The skin near the injection site may be damaged and become discolored or indented  This can happen if the steroid is placed too close to your skin   A tendon near the injection site may rupture or a nerve can be damaged  Contact your healthcare provider if:   You have fever or chills  You have redness or swelling in the injection site  You have more pain than usual in your joint for more than 72 hours  You have questions or concerns about your condition or care  Medicines:   Pain medicine  may be given  Ask how to take this medicine safely  Take your medicine as directed  Contact your healthcare provider if you think your medicine is not helping or if you have side effects  Tell him or her if you are allergic to any medicine  Keep a list of the medicines, vitamins, and herbs you take  Include the amounts, and when and why you take them  Bring the list or the pill bottles to follow-up visits  Carry your medicine list with you in case of an emergency  Self-care:   Leave the bandage on for 8 to 12 hours  Care for your wound as directed  Rest the area  as directed  You may need to decrease weight on certain joints, such as the knee, for a period of time  Ask when you can return to your daily activities  Elevate  your limb where the steroid injection was given  Elevate the limb above the level of your heart as often as you can  This will help decrease swelling and pain  Prop your limb on pillows or blankets to keep it elevated comfortably  Apply ice  on your joint for 15 to 20 minutes every hour or as directed  Use an ice pack, or put crushed ice in a plastic bag  Cover it with a towel  Ice helps prevent tissue damage and decreases swelling and pain  Follow up with your doctor as directed:  Write down your questions so you remember to ask them during your visits  © Copyright [employer] 2022 Information is for End User's use only and may not be sold, redistributed or otherwise used for commercial purposes   All illustrations and images included in CareNotes® are the copyrighted property of [employer]  or [employer]  The above information is an  only  It is not intended as medical advice for individual conditions or treatments  Talk to your doctor, nurse or pharmacist before following any medical regimen to see if it is safe and effective for you

## 2022-09-19 NOTE — LETTER
September 19, 2022     Patient: Dina Gold  YOB: 1970  Date of Visit: 9/19/2022      To Whom it May Concern:    Pennie Hernandez is under my professional care  Michael Caicedo was seen in my office on 9/19/2022  Work excuse until further notice  If you have any questions or concerns, please don't hesitate to call           Sincerely,          Brittney Palmer MD        CC: No Recipients

## 2022-10-17 ENCOUNTER — OFFICE VISIT (OUTPATIENT)
Dept: OBGYN CLINIC | Facility: CLINIC | Age: 52
End: 2022-10-17
Payer: COMMERCIAL

## 2022-10-17 VITALS
DIASTOLIC BLOOD PRESSURE: 98 MMHG | WEIGHT: 252 LBS | BODY MASS INDEX: 33.4 KG/M2 | HEIGHT: 73 IN | SYSTOLIC BLOOD PRESSURE: 142 MMHG

## 2022-10-17 DIAGNOSIS — M17.0 PRIMARY OSTEOARTHRITIS OF BOTH KNEES: ICD-10-CM

## 2022-10-17 DIAGNOSIS — M25.561 CHRONIC PAIN OF BOTH KNEES: Primary | ICD-10-CM

## 2022-10-17 DIAGNOSIS — M25.562 CHRONIC PAIN OF BOTH KNEES: Primary | ICD-10-CM

## 2022-10-17 DIAGNOSIS — M25.461 EFFUSION OF BOTH KNEE JOINTS: ICD-10-CM

## 2022-10-17 DIAGNOSIS — G89.29 CHRONIC PAIN OF BOTH KNEES: Primary | ICD-10-CM

## 2022-10-17 DIAGNOSIS — M25.462 EFFUSION OF BOTH KNEE JOINTS: ICD-10-CM

## 2022-10-17 PROCEDURE — 99213 OFFICE O/P EST LOW 20 MIN: CPT | Performed by: EMERGENCY MEDICINE

## 2022-10-17 NOTE — PROGRESS NOTES
Assessment/Plan:    Diagnoses and all orders for this visit:    Chronic pain of both knees    Primary osteoarthritis of both knees    Effusion of both knee joints    Patient much improved after corticosteroid injection of the right knee  At this point in time will continue his current course if symptoms return or worsen to consider MRI of the right knee however at this point in time his symptoms are likely attributed to the degenerative change in osteoarthritis seen on Xray  Return if symptoms worsen or fail to improve  Chief Complaint:     Chief Complaint   Patient presents with   • Right Knee - Follow-up, Pain, Swelling       Subjective:   Patient ID: Kimberley Trejo is a 50 y o  male  Patient returns s/p CSI right knee with significant improvement  He will experiencing shooting pain of the medial knee, but is able to tolerate work and not affecting him there  Denies painful popping  Initial note:  NP presents for Right medial and anterior/infrapatellar knee pain x 1-2 months, denies injury but does have hx of tick bites and alternating pain and swelling b/l knees  Recent Xrays obtained recently   Denies F/C, taking Mobic        Review of Systems    The following portions of the patient's chart were reviewed and updated as appropriate:    Allergy:  No Known Allergies      Past Medical History:   Diagnosis Date   • Hypertension        Past Surgical History:   Procedure Laterality Date   • HERNIA REPAIR         Social History     Socioeconomic History   • Marital status: Single     Spouse name: Not on file   • Number of children: Not on file   • Years of education: Not on file   • Highest education level: Not on file   Occupational History   • Not on file   Tobacco Use   • Smoking status: Never Smoker   • Smokeless tobacco: Current User     Types: Chew   Vaping Use   • Vaping Use: Never used   Substance and Sexual Activity   • Alcohol use: Yes     Comment: socal   • Drug use: Never   • Sexual activity: Not on file   Other Topics Concern   • Not on file   Social History Narrative   • Not on file     Social Determinants of Health     Financial Resource Strain: Not on file   Food Insecurity: Not on file   Transportation Needs: Not on file   Physical Activity: Not on file   Stress: Not on file   Social Connections: Not on file   Intimate Partner Violence: Not on file   Housing Stability: Not on file       Family History   Problem Relation Age of Onset   • COPD Mother    • Hypertension Father    • Diabetes Father    • Lung cancer Father        Medications:    Current Outpatient Medications:   •  buPROPion (Wellbutrin SR) 150 mg 12 hr tablet, Take 1 tablet (150 mg total) by mouth 2 (two) times a day, Disp: 60 tablet, Rfl: 2  •  meloxicam (Mobic) 15 mg tablet, Take 1 tablet (15 mg total) by mouth daily, Disp: 30 tablet, Rfl: 0  •  triamcinolone (KENALOG) 0 1 % ointment, Apply topically 2 (two) times a day, Disp: 80 g, Rfl: 0  •  albuterol (ProAir HFA) 90 mcg/act inhaler, Inhale 2 puffs every 6 (six) hours as needed for wheezing or shortness of breath (Patient not taking: No sig reported), Disp: 18 g, Rfl: 0  •  benzonatate (TESSALON PERLES) 100 mg capsule, Take 1 capsule (100 mg total) by mouth 3 (three) times a day as needed for cough (Patient not taking: No sig reported), Disp: 20 capsule, Rfl: 0  •  traMADol (Ultram) 50 mg tablet, Take 1 tablet (50 mg total) by mouth daily at bedtime as needed for severe pain (Patient not taking: No sig reported), Disp: 7 tablet, Rfl: 0    Patient Active Problem List   Diagnosis   • Acute pain of left knee   • Continuous opioid dependence (HCC)       Objective:  /98   Ht 6' 1" (1 854 m)   Wt 114 kg (252 lb)   BMI 33 25 kg/m²     Right Knee Exam     Other   Erythema: absent  Swelling: mild  Effusion: effusion present          Observations     Right Knee   Positive for effusion  Physical Exam  Musculoskeletal:      Right knee: Effusion present  Neurologic Exam    Procedures    I have personally reviewed the written report of the pertinent studies       Synovial fluid analysis WNL      RIGHT KNEE  INDICATION:   M25 562: Pain in left knee        COMPARISON:  Right knee x-ray 9/7/2004     VIEWS:  XR KNEE 3 VW RIGHT NON INJURY   Images: 3     FINDINGS:     There is no acute fracture or dislocation      There is no joint effusion      Mild osteoarthritis with narrowing of the medial tibiofemoral joint      No lytic or blastic osseous lesion      Soft tissues are unremarkable      IMPRESSION:     No acute osseous abnormality

## 2023-01-18 DIAGNOSIS — Z57.9 OCCUPATIONAL EXPOSURE IN WORKPLACE: Primary | ICD-10-CM

## 2023-01-18 DIAGNOSIS — R53.83 FATIGUE, UNSPECIFIED TYPE: ICD-10-CM

## 2023-01-18 SDOH — HEALTH STABILITY - PHYSICAL HEALTH: OCCUPATIONAL EXPOSURE TO UNSPECIFIED RISK FACTOR: Z57.9

## 2023-09-28 DIAGNOSIS — Z12.5 PROSTATE CANCER SCREENING: ICD-10-CM

## 2023-09-28 DIAGNOSIS — Z11.59 ENCOUNTER FOR HEPATITIS C SCREENING TEST FOR LOW RISK PATIENT: ICD-10-CM

## 2023-09-28 DIAGNOSIS — Z12.11 SCREEN FOR COLON CANCER: ICD-10-CM

## 2023-09-28 DIAGNOSIS — Z13.228 SCREENING FOR METABOLIC DISORDER: ICD-10-CM

## 2023-09-28 DIAGNOSIS — R53.83 FATIGUE, UNSPECIFIED TYPE: Primary | ICD-10-CM

## 2023-09-28 DIAGNOSIS — M25.50 ARTHRALGIA, UNSPECIFIED JOINT: ICD-10-CM

## 2023-09-28 DIAGNOSIS — L30.9 DERMATITIS: ICD-10-CM

## 2023-10-30 ENCOUNTER — APPOINTMENT (OUTPATIENT)
Dept: LAB | Facility: MEDICAL CENTER | Age: 53
End: 2023-10-30

## 2023-10-30 ENCOUNTER — APPOINTMENT (OUTPATIENT)
Dept: URGENT CARE | Facility: MEDICAL CENTER | Age: 53
End: 2023-10-30

## 2023-12-11 ENCOUNTER — APPOINTMENT (OUTPATIENT)
Dept: RADIOLOGY | Facility: CLINIC | Age: 53
End: 2023-12-11
Payer: COMMERCIAL

## 2023-12-11 DIAGNOSIS — M54.50: ICD-10-CM

## 2023-12-11 PROCEDURE — 72110 X-RAY EXAM L-2 SPINE 4/>VWS: CPT

## 2023-12-12 DIAGNOSIS — M25.50 ARTHRALGIA, UNSPECIFIED JOINT: ICD-10-CM

## 2023-12-12 DIAGNOSIS — Z11.59 ENCOUNTER FOR HEPATITIS C SCREENING TEST FOR LOW RISK PATIENT: ICD-10-CM

## 2023-12-12 DIAGNOSIS — R53.83 FATIGUE: ICD-10-CM

## 2023-12-12 DIAGNOSIS — Z12.5 PROSTATE CANCER SCREENING: ICD-10-CM

## 2023-12-12 DIAGNOSIS — Z13.228 SCREENING FOR METABOLIC DISORDER: Primary | ICD-10-CM

## 2023-12-30 ENCOUNTER — APPOINTMENT (OUTPATIENT)
Dept: LAB | Facility: IMAGING CENTER | Age: 53
End: 2023-12-30
Payer: COMMERCIAL

## 2023-12-30 DIAGNOSIS — Z11.59 ENCOUNTER FOR HEPATITIS C SCREENING TEST FOR LOW RISK PATIENT: ICD-10-CM

## 2023-12-30 DIAGNOSIS — Z57.9 OCCUPATIONAL EXPOSURE IN WORKPLACE: ICD-10-CM

## 2023-12-30 DIAGNOSIS — R53.83 FATIGUE: ICD-10-CM

## 2023-12-30 DIAGNOSIS — Z12.5 PROSTATE CANCER SCREENING: ICD-10-CM

## 2023-12-30 DIAGNOSIS — M25.50 ARTHRALGIA, UNSPECIFIED JOINT: ICD-10-CM

## 2023-12-30 DIAGNOSIS — R53.83 FATIGUE, UNSPECIFIED TYPE: ICD-10-CM

## 2023-12-30 DIAGNOSIS — Z13.228 SCREENING FOR METABOLIC DISORDER: ICD-10-CM

## 2023-12-30 LAB
ALBUMIN SERPL BCP-MCNC: 4.4 G/DL (ref 3.5–5)
ALP SERPL-CCNC: 86 U/L (ref 34–104)
ALT SERPL W P-5'-P-CCNC: 59 U/L (ref 7–52)
ANION GAP SERPL CALCULATED.3IONS-SCNC: 12 MMOL/L
AST SERPL W P-5'-P-CCNC: 64 U/L (ref 13–39)
BASOPHILS # BLD AUTO: 0.07 THOUSANDS/ÂΜL (ref 0–0.1)
BASOPHILS NFR BLD AUTO: 1 % (ref 0–1)
BILIRUB SERPL-MCNC: 0.75 MG/DL (ref 0.2–1)
BUN SERPL-MCNC: 14 MG/DL (ref 5–25)
CALCIUM SERPL-MCNC: 10.4 MG/DL (ref 8.4–10.2)
CHLORIDE SERPL-SCNC: 102 MMOL/L (ref 96–108)
CHOLEST SERPL-MCNC: 282 MG/DL
CO2 SERPL-SCNC: 25 MMOL/L (ref 21–32)
CREAT SERPL-MCNC: 0.99 MG/DL (ref 0.6–1.3)
EOSINOPHIL # BLD AUTO: 0.21 THOUSAND/ÂΜL (ref 0–0.61)
EOSINOPHIL NFR BLD AUTO: 4 % (ref 0–6)
ERYTHROCYTE [DISTWIDTH] IN BLOOD BY AUTOMATED COUNT: 11.9 % (ref 11.6–15.1)
GFR SERPL CREATININE-BSD FRML MDRD: 87 ML/MIN/1.73SQ M
GLUCOSE P FAST SERPL-MCNC: 100 MG/DL (ref 65–99)
HCT VFR BLD AUTO: 48.3 % (ref 36.5–49.3)
HDLC SERPL-MCNC: 45 MG/DL
HGB BLD-MCNC: 16.1 G/DL (ref 12–17)
IMM GRANULOCYTES # BLD AUTO: 0.02 THOUSAND/UL (ref 0–0.2)
IMM GRANULOCYTES NFR BLD AUTO: 0 % (ref 0–2)
LYMPHOCYTES # BLD AUTO: 1.55 THOUSANDS/ÂΜL (ref 0.6–4.47)
LYMPHOCYTES NFR BLD AUTO: 28 % (ref 14–44)
MCH RBC QN AUTO: 30.1 PG (ref 26.8–34.3)
MCHC RBC AUTO-ENTMCNC: 33.3 G/DL (ref 31.4–37.4)
MCV RBC AUTO: 90 FL (ref 82–98)
MONOCYTES # BLD AUTO: 0.51 THOUSAND/ÂΜL (ref 0.17–1.22)
MONOCYTES NFR BLD AUTO: 9 % (ref 4–12)
NEUTROPHILS # BLD AUTO: 3.1 THOUSANDS/ÂΜL (ref 1.85–7.62)
NEUTS SEG NFR BLD AUTO: 58 % (ref 43–75)
NONHDLC SERPL-MCNC: 237 MG/DL
NRBC BLD AUTO-RTO: 0 /100 WBCS
PLATELET # BLD AUTO: 269 THOUSANDS/UL (ref 149–390)
PMV BLD AUTO: 11.4 FL (ref 8.9–12.7)
POTASSIUM SERPL-SCNC: 4.1 MMOL/L (ref 3.5–5.3)
PROT SERPL-MCNC: 7.5 G/DL (ref 6.4–8.4)
PSA SERPL-MCNC: 0.67 NG/ML (ref 0–4)
RBC # BLD AUTO: 5.34 MILLION/UL (ref 3.88–5.62)
SODIUM SERPL-SCNC: 139 MMOL/L (ref 135–147)
TRIGL SERPL-MCNC: 759 MG/DL
TSH SERPL DL<=0.05 MIU/L-ACNC: 2.03 UIU/ML (ref 0.45–4.5)
WBC # BLD AUTO: 5.46 THOUSAND/UL (ref 4.31–10.16)

## 2023-12-30 PROCEDURE — 86803 HEPATITIS C AB TEST: CPT

## 2023-12-30 PROCEDURE — G0103 PSA SCREENING: HCPCS

## 2023-12-30 PROCEDURE — 83655 ASSAY OF LEAD: CPT

## 2023-12-30 PROCEDURE — 82175 ASSAY OF ARSENIC: CPT

## 2023-12-30 PROCEDURE — 85025 COMPLETE CBC W/AUTO DIFF WBC: CPT

## 2023-12-30 PROCEDURE — 36415 COLL VENOUS BLD VENIPUNCTURE: CPT

## 2023-12-30 PROCEDURE — 83825 ASSAY OF MERCURY: CPT

## 2023-12-30 PROCEDURE — 80053 COMPREHEN METABOLIC PANEL: CPT

## 2023-12-30 PROCEDURE — 80061 LIPID PANEL: CPT

## 2023-12-30 PROCEDURE — 84443 ASSAY THYROID STIM HORMONE: CPT

## 2023-12-30 SDOH — HEALTH STABILITY - PHYSICAL HEALTH: OCCUPATIONAL EXPOSURE TO UNSPECIFIED RISK FACTOR: Z57.9

## 2023-12-31 LAB — HCV AB SER QL: NORMAL

## 2024-01-11 ENCOUNTER — OFFICE VISIT (OUTPATIENT)
Dept: INTERNAL MEDICINE CLINIC | Age: 54
End: 2024-01-11
Payer: COMMERCIAL

## 2024-01-11 VITALS
TEMPERATURE: 97.7 F | DIASTOLIC BLOOD PRESSURE: 84 MMHG | OXYGEN SATURATION: 97 % | SYSTOLIC BLOOD PRESSURE: 144 MMHG | WEIGHT: 263 LBS | HEART RATE: 98 BPM | HEIGHT: 73 IN | BODY MASS INDEX: 34.85 KG/M2

## 2024-01-11 DIAGNOSIS — E83.52 HYPERCALCEMIA: ICD-10-CM

## 2024-01-11 DIAGNOSIS — I10 ESSENTIAL HYPERTENSION: ICD-10-CM

## 2024-01-11 DIAGNOSIS — E78.5 DYSLIPIDEMIA: ICD-10-CM

## 2024-01-11 DIAGNOSIS — E78.1 HYPERTRIGLYCERIDEMIA: Primary | ICD-10-CM

## 2024-01-11 DIAGNOSIS — Z91.030 BEE STING ALLERGY: ICD-10-CM

## 2024-01-11 DIAGNOSIS — F41.1 GAD (GENERALIZED ANXIETY DISORDER): ICD-10-CM

## 2024-01-11 LAB
ARSENIC BLD-MCNC: 3 UG/L (ref 0–9)
LEAD BLD-MCNC: 3.5 UG/DL (ref 0–3.4)
MERCURY BLD-MCNC: 4.1 UG/L (ref 0–14.9)

## 2024-01-11 PROCEDURE — 99214 OFFICE O/P EST MOD 30 MIN: CPT | Performed by: PHYSICIAN ASSISTANT

## 2024-01-11 RX ORDER — LOSARTAN POTASSIUM 50 MG/1
50 TABLET ORAL DAILY
Qty: 30 TABLET | Refills: 3 | Status: SHIPPED | OUTPATIENT
Start: 2024-01-11

## 2024-01-11 RX ORDER — EPINEPHRINE 0.3 MG/.3ML
0.3 INJECTION SUBCUTANEOUS ONCE
Qty: 0.6 ML | Refills: 0 | Status: SHIPPED | OUTPATIENT
Start: 2024-01-11 | End: 2024-01-11

## 2024-01-11 RX ORDER — ALPRAZOLAM 0.5 MG/1
0.5 TABLET ORAL DAILY PRN
Qty: 30 TABLET | Refills: 1 | Status: SHIPPED | OUTPATIENT
Start: 2024-01-11

## 2024-01-11 RX ORDER — ROSUVASTATIN CALCIUM 5 MG/1
5 TABLET, COATED ORAL DAILY
Qty: 30 TABLET | Refills: 3 | Status: SHIPPED | OUTPATIENT
Start: 2024-01-11

## 2024-01-11 RX ORDER — FENOFIBRATE 145 MG/1
145 TABLET, COATED ORAL DAILY
Qty: 30 TABLET | Refills: 3 | Status: SHIPPED | OUTPATIENT
Start: 2024-01-11

## 2024-01-11 NOTE — PROGRESS NOTES
Assessment/Plan:         Diagnoses and all orders for this visit:    Hypertriglyceridemia  Comments:  +fenofibrate  fish oils, avoid carbs  increase fatty fish  Orders:  -     fenofibrate (TRICOR) 145 mg tablet; Take 1 tablet (145 mg total) by mouth daily  -     Comprehensive metabolic panel; Future  -     Triglycerides; Future    Dyslipidemia  Comments:  low chol diet  trial crestor low dose  f/u labs in 1 month  Orders:  -     rosuvastatin (CRESTOR) 5 mg tablet; Take 1 tablet (5 mg total) by mouth daily  -     Comprehensive metabolic panel; Future  -     Triglycerides; Future    Hypercalcemia  -     Comprehensive metabolic panel; Future  -     Triglycerides; Future    Bee sting allergy  -     EPINEPHrine (EpiPen 2-Tim) 0.3 mg/0.3 mL SOAJ; Inject 0.3 mL (0.3 mg total) into a muscle once for 1 dose    Essential hypertension  Comments:  +losartan  Orders:  -     losartan (COZAAR) 50 mg tablet; Take 1 tablet (50 mg total) by mouth daily    DELCIIA (generalized anxiety disorder)  Comments:  discussed use of xanax, avoid daily use  pdmp queried  pt will avoid when working,driving  Orders:  -     ALPRAZolam (XANAX) 0.5 mg tablet; Take 1 tablet (0.5 mg total) by mouth daily as needed for anxiety          Subjective:      Patient ID: Earnest Andre is a 53 y.o. male.    52 y/o male with hx of hld presents for f/u and review of labs  Pt requests epi pen for bee sting allergy       Chol level elevated as well as trigs >700  Only had chol labs done in 2012 and were elevated but much worse now  Pt has not been following healthy diet  Will start fish oils   Recommend adding fenofibrate and crestor - will start one at a time     Mild elevation in ca+  Drinks milk daily but does not take ca supplements or tums etc    Mild elevation in lfts  Pt drank beer night prior to labs  Decrease use of etho and repeat labs will closely montior with addition of statin and fenofibrate    C/o anxiety at times with work - would like to take xanax  only prn in stressful situation, has taken in past and tolerated, does nto want to take something on daily basis   Discussed risk of dependence and to avoid daily use  Discussed avoiding with driving or working until pt knows how this will affect him     BP elevated, reviewed chart has been over the past visits over the past fw years  +losartan  Did not tolerate lisniopril in past   F/u in 2-3 weeks  Report any SE         The following portions of the patient's history were reviewed and updated as appropriate: allergies, current medications, past family history, past medical history, past social history, past surgical history, and problem list.    Review of Systems   Constitutional:  Positive for fatigue. Negative for appetite change, chills and fever.   HENT:  Negative for congestion and sore throat.    Eyes:  Negative for pain and redness.   Respiratory:  Negative for cough, shortness of breath and wheezing.    Cardiovascular:  Negative for chest pain and leg swelling.   Gastrointestinal:  Negative for abdominal pain, constipation, diarrhea and nausea.   Musculoskeletal:  Negative for arthralgias and back pain.   Skin:  Negative for rash.   Neurological:  Negative for dizziness, light-headedness and headaches.   Psychiatric/Behavioral:  Negative for sleep disturbance. The patient is not nervous/anxious.          Past Medical History:   Diagnosis Date    Hypertension          Current Outpatient Medications:     ALPRAZolam (XANAX) 0.5 mg tablet, Take 1 tablet (0.5 mg total) by mouth daily as needed for anxiety, Disp: 30 tablet, Rfl: 1    EPINEPHrine (EpiPen 2-Tim) 0.3 mg/0.3 mL SOAJ, Inject 0.3 mL (0.3 mg total) into a muscle once for 1 dose, Disp: 0.6 mL, Rfl: 0    fenofibrate (TRICOR) 145 mg tablet, Take 1 tablet (145 mg total) by mouth daily, Disp: 30 tablet, Rfl: 3    losartan (COZAAR) 50 mg tablet, Take 1 tablet (50 mg total) by mouth daily, Disp: 30 tablet, Rfl: 3    rosuvastatin (CRESTOR) 5 mg tablet, Take 1  "tablet (5 mg total) by mouth daily, Disp: 30 tablet, Rfl: 3    triamcinolone (KENALOG) 0.1 % ointment, Apply topically 2 (two) times a day (Patient taking differently: Apply topically 2 (two) times a day prn), Disp: 80 g, Rfl: 0    Allergies   Allergen Reactions    Honey Bee Venom Swelling    Other Cough     Seasonal- cough, sneezing, congestion       Social History   Past Surgical History:   Procedure Laterality Date    HERNIA REPAIR       Family History   Problem Relation Age of Onset    COPD Mother     Hypertension Father     Diabetes Father     Lung cancer Father        Objective:  /84 (BP Location: Left arm, Patient Position: Sitting, Cuff Size: Large)   Pulse 98   Temp 97.7 °F (36.5 °C) (Temporal)   Ht 6' 1\" (1.854 m)   Wt 119 kg (263 lb)   SpO2 97%   BMI 34.70 kg/m²        Physical Exam  Vitals reviewed.   HENT:      Head: Normocephalic and atraumatic.      Right Ear: Tympanic membrane, ear canal and external ear normal. There is no impacted cerumen.      Left Ear: Tympanic membrane, ear canal and external ear normal. There is no impacted cerumen.      Mouth/Throat:      Mouth: Mucous membranes are moist.   Eyes:      General:         Right eye: No discharge.         Left eye: No discharge.      Extraocular Movements: Extraocular movements intact.      Conjunctiva/sclera: Conjunctivae normal.      Pupils: Pupils are equal, round, and reactive to light.   Cardiovascular:      Rate and Rhythm: Normal rate and regular rhythm.   Pulmonary:      Effort: Pulmonary effort is normal. No respiratory distress.      Breath sounds: Normal breath sounds. No wheezing or rales.   Abdominal:      General: Bowel sounds are normal.   Musculoskeletal:      Cervical back: Normal range of motion.      Right lower leg: No edema.      Left lower leg: No edema.   Lymphadenopathy:      Cervical: No cervical adenopathy.   Skin:     General: Skin is warm.      Findings: No erythema or rash.   Neurological:      General: No " focal deficit present.      Mental Status: He is alert and oriented to person, place, and time.   Psychiatric:         Mood and Affect: Mood normal.         Behavior: Behavior normal.

## 2024-02-10 ENCOUNTER — APPOINTMENT (OUTPATIENT)
Dept: LAB | Facility: IMAGING CENTER | Age: 54
End: 2024-02-10
Payer: COMMERCIAL

## 2024-02-10 DIAGNOSIS — I10 ESSENTIAL HYPERTENSION: ICD-10-CM

## 2024-02-10 DIAGNOSIS — R53.83 FATIGUE, UNSPECIFIED TYPE: ICD-10-CM

## 2024-02-10 DIAGNOSIS — E83.52 HYPERCALCEMIA: ICD-10-CM

## 2024-02-10 DIAGNOSIS — E78.5 DYSLIPIDEMIA: ICD-10-CM

## 2024-02-10 DIAGNOSIS — Z13.228 SCREENING FOR METABOLIC DISORDER: ICD-10-CM

## 2024-02-10 DIAGNOSIS — E78.1 HYPERTRIGLYCERIDEMIA: ICD-10-CM

## 2024-02-10 DIAGNOSIS — R53.83 FATIGUE: ICD-10-CM

## 2024-02-10 LAB
ALBUMIN SERPL BCP-MCNC: 4.3 G/DL (ref 3.5–5)
ALP SERPL-CCNC: 64 U/L (ref 34–104)
ALT SERPL W P-5'-P-CCNC: 36 U/L (ref 7–52)
ANION GAP SERPL CALCULATED.3IONS-SCNC: 9 MMOL/L
AST SERPL W P-5'-P-CCNC: 37 U/L (ref 13–39)
BILIRUB SERPL-MCNC: 0.6 MG/DL (ref 0.2–1)
BUN SERPL-MCNC: 23 MG/DL (ref 5–25)
CALCIUM SERPL-MCNC: 9.1 MG/DL (ref 8.4–10.2)
CHLORIDE SERPL-SCNC: 103 MMOL/L (ref 96–108)
CO2 SERPL-SCNC: 27 MMOL/L (ref 21–32)
CREAT SERPL-MCNC: 1.16 MG/DL (ref 0.6–1.3)
GFR SERPL CREATININE-BSD FRML MDRD: 72 ML/MIN/1.73SQ M
GLUCOSE P FAST SERPL-MCNC: 105 MG/DL (ref 65–99)
POTASSIUM SERPL-SCNC: 4.2 MMOL/L (ref 3.5–5.3)
PROT SERPL-MCNC: 7.2 G/DL (ref 6.4–8.4)
SODIUM SERPL-SCNC: 139 MMOL/L (ref 135–147)
TRIGL SERPL-MCNC: 233 MG/DL

## 2024-02-10 PROCEDURE — 84478 ASSAY OF TRIGLYCERIDES: CPT

## 2024-02-10 PROCEDURE — 84402 ASSAY OF FREE TESTOSTERONE: CPT

## 2024-02-10 PROCEDURE — 84403 ASSAY OF TOTAL TESTOSTERONE: CPT

## 2024-02-10 PROCEDURE — 36415 COLL VENOUS BLD VENIPUNCTURE: CPT

## 2024-02-10 PROCEDURE — 80053 COMPREHEN METABOLIC PANEL: CPT

## 2024-02-10 RX ORDER — ROSUVASTATIN CALCIUM 5 MG/1
5 TABLET, COATED ORAL DAILY
Qty: 90 TABLET | Refills: 1 | Status: SHIPPED | OUTPATIENT
Start: 2024-02-10

## 2024-02-10 RX ORDER — LOSARTAN POTASSIUM 50 MG/1
50 TABLET ORAL DAILY
Qty: 90 TABLET | Refills: 1 | Status: SHIPPED | OUTPATIENT
Start: 2024-02-10 | End: 2024-02-15 | Stop reason: SDUPTHER

## 2024-02-10 RX ORDER — FENOFIBRATE 145 MG/1
145 TABLET, COATED ORAL DAILY
Qty: 90 TABLET | Refills: 1 | Status: SHIPPED | OUTPATIENT
Start: 2024-02-10

## 2024-02-12 LAB
TESTOST FREE SERPL-MCNC: 10.4 PG/ML (ref 7.2–24)
TESTOST SERPL-MCNC: 335 NG/DL (ref 264–916)

## 2024-02-15 ENCOUNTER — OFFICE VISIT (OUTPATIENT)
Dept: INTERNAL MEDICINE CLINIC | Age: 54
End: 2024-02-15
Payer: COMMERCIAL

## 2024-02-15 VITALS
TEMPERATURE: 97.2 F | HEART RATE: 93 BPM | OXYGEN SATURATION: 97 % | WEIGHT: 269 LBS | HEIGHT: 73 IN | BODY MASS INDEX: 35.65 KG/M2 | DIASTOLIC BLOOD PRESSURE: 92 MMHG | SYSTOLIC BLOOD PRESSURE: 156 MMHG

## 2024-02-15 DIAGNOSIS — Z23 NEED FOR SHINGLES VACCINE: ICD-10-CM

## 2024-02-15 DIAGNOSIS — H61.22 HEARING LOSS DUE TO CERUMEN IMPACTION, LEFT: ICD-10-CM

## 2024-02-15 DIAGNOSIS — E78.2 MIXED HYPERLIPIDEMIA: ICD-10-CM

## 2024-02-15 DIAGNOSIS — F41.1 GAD (GENERALIZED ANXIETY DISORDER): ICD-10-CM

## 2024-02-15 DIAGNOSIS — Z23 ENCOUNTER FOR IMMUNIZATION: ICD-10-CM

## 2024-02-15 DIAGNOSIS — I10 ESSENTIAL HYPERTENSION: Primary | ICD-10-CM

## 2024-02-15 PROCEDURE — 90750 HZV VACC RECOMBINANT IM: CPT

## 2024-02-15 PROCEDURE — 90471 IMMUNIZATION ADMIN: CPT

## 2024-02-15 PROCEDURE — 69210 REMOVE IMPACTED EAR WAX UNI: CPT | Performed by: PHYSICIAN ASSISTANT

## 2024-02-15 PROCEDURE — 99213 OFFICE O/P EST LOW 20 MIN: CPT | Performed by: PHYSICIAN ASSISTANT

## 2024-02-15 RX ORDER — LOSARTAN POTASSIUM 100 MG/1
100 TABLET ORAL DAILY
Qty: 90 TABLET | Refills: 1 | Status: SHIPPED | OUTPATIENT
Start: 2024-02-15

## 2024-02-15 RX ORDER — ALPRAZOLAM 1 MG/1
1.5 TABLET ORAL DAILY PRN
Start: 2024-02-15

## 2024-02-15 NOTE — PROGRESS NOTES
Assessment/Plan:         Diagnoses and all orders for this visit:    Essential hypertension  Comments:  increase losartan to 100mg daily  Orders:  -     losartan (COZAAR) 100 MG tablet; Take 1 tablet (100 mg total) by mouth daily  -     Comprehensive metabolic panel; Future  -     Lipid panel; Future  -     CBC and differential; Future    DELICIA (generalized anxiety disorder)  Comments:  discussed use of xanax, may increase to 1.5mg daily, avoid daily use  pdmp queried  pt will avoid when working,driving  Orders:  -     ALPRAZolam (XANAX) 1 mg tablet; Take 1.5 tablets (1.5 mg total) by mouth daily as needed for anxiety  -     Comprehensive metabolic panel; Future  -     Lipid panel; Future  -     CBC and differential; Future    Mixed hyperlipidemia  Comments:  continue rosuvustatin and fenofibrate   f/u labs in 3 months  Orders:  -     Comprehensive metabolic panel; Future  -     Lipid panel; Future  -     CBC and differential; Future    Encounter for immunization  -     Zoster Vaccine Recombinant IM    Need for shingles vaccine  -     Zoster Vaccine Recombinant IM    Other orders  -     Ear cerumen removal          Subjective:      Patient ID: Earnest Andre is a 53 y.o. male.    54 y/o male with hx of HLD, HTN presents for f/u   Pt started losartan, fenofibrate, crestor and has been tolerating this well   Repeat labs show greatly improved triglycerides and normal CMP    Pt c/o L ear fullness/ crackling - believe he has wax build up     Request shingles vaccine today     Discussed testosterone level with pt, within normal limit for age  Recommend weight bearing exercise to improve testosterone levels  If pt has ED sx can refer to urology, pt declines    BP slightly elevated on losartan 50mg   Will increase today         The following portions of the patient's history were reviewed and updated as appropriate: allergies, current medications, past family history, past medical history, past social history, past surgical  history, and problem list.    Review of Systems   Constitutional:  Negative for activity change, appetite change, chills, fatigue and fever.   HENT:  Negative for congestion and sore throat.    Eyes:  Negative for pain and redness.   Respiratory:  Negative for cough, shortness of breath and wheezing.    Cardiovascular:  Negative for chest pain, palpitations and leg swelling.   Gastrointestinal:  Negative for abdominal pain, constipation, diarrhea and nausea.   Genitourinary:  Negative for dysuria and flank pain.   Musculoskeletal:  Negative for arthralgias and back pain.   Neurological:  Negative for dizziness and headaches.         Past Medical History:   Diagnosis Date    Hypertension          Current Outpatient Medications:     ALPRAZolam (XANAX) 1 mg tablet, Take 1.5 tablets (1.5 mg total) by mouth daily as needed for anxiety, Disp: , Rfl:     EPINEPHrine (EpiPen 2-Tim) 0.3 mg/0.3 mL SOAJ, Inject 0.3 mL (0.3 mg total) into a muscle once for 1 dose, Disp: 0.6 mL, Rfl: 0    fenofibrate (TRICOR) 145 mg tablet, Take 1 tablet (145 mg total) by mouth daily, Disp: 90 tablet, Rfl: 1    losartan (COZAAR) 100 MG tablet, Take 1 tablet (100 mg total) by mouth daily, Disp: 90 tablet, Rfl: 1    rosuvastatin (CRESTOR) 5 mg tablet, Take 1 tablet (5 mg total) by mouth daily, Disp: 90 tablet, Rfl: 1    triamcinolone (KENALOG) 0.1 % ointment, Apply topically 2 (two) times a day (Patient taking differently: Apply topically 2 (two) times a day prn), Disp: 80 g, Rfl: 0    Allergies   Allergen Reactions    Honey Bee Venom Swelling    Other Cough     Seasonal- cough, sneezing, congestion       Social History   Past Surgical History:   Procedure Laterality Date    HERNIA REPAIR       Family History   Problem Relation Age of Onset    COPD Mother     Hypertension Father     Diabetes Father     Lung cancer Father        Objective:  /92 (BP Location: Left arm, Patient Position: Sitting, Cuff Size: Large)   Pulse 93   Temp (!) 97.2  "°F (36.2 °C) (Temporal)   Ht 6' 1\" (1.854 m)   Wt 122 kg (269 lb)   SpO2 97% Comment: room air  BMI 35.49 kg/m²        Physical Exam  Vitals reviewed.   HENT:      Head: Normocephalic and atraumatic.      Right Ear: Tympanic membrane, ear canal and external ear normal. There is no impacted cerumen.      Left Ear: There is impacted cerumen.      Nose: Nose normal.      Mouth/Throat:      Mouth: Mucous membranes are moist.   Eyes:      General:         Right eye: No discharge.         Left eye: No discharge.      Extraocular Movements: Extraocular movements intact.      Conjunctiva/sclera: Conjunctivae normal.      Pupils: Pupils are equal, round, and reactive to light.   Cardiovascular:      Rate and Rhythm: Normal rate and regular rhythm.   Pulmonary:      Effort: Pulmonary effort is normal.      Breath sounds: Normal breath sounds.   Musculoskeletal:      Right lower leg: No edema.      Left lower leg: No edema.   Skin:     Findings: No erythema or rash.   Neurological:      General: No focal deficit present.      Mental Status: He is alert and oriented to person, place, and time.         Ear cerumen removal    Date/Time: 2/15/2024 4:40 PM    Performed by: Katheryn Sprague PA-C  Authorized by: Katheryn Sprague PA-C  Universal Protocol:  Consent: Verbal consent obtained.  Risks and benefits: risks, benefits and alternatives were discussed  Consent given by: patient  Timeout called at: 2/15/2024 4:40 PM.  Patient understanding: patient states understanding of the procedure being performed    Patient location:  Clinic  Procedure details:     Local anesthetic:  None    Location:  L ear    Procedure type: irrigation with instrumentation      Instrumentation: loop      Approach:  External  Post-procedure details:     Complication:  None    Hearing quality:  Improved    Patient tolerance of procedure:  Tolerated well, no immediate complications      "

## 2024-02-26 DIAGNOSIS — F41.1 GAD (GENERALIZED ANXIETY DISORDER): ICD-10-CM

## 2024-02-27 RX ORDER — ALPRAZOLAM 1 MG/1
1.5 TABLET ORAL DAILY PRN
Qty: 45 TABLET | Refills: 0 | Status: SHIPPED | OUTPATIENT
Start: 2024-02-27

## 2024-03-26 ENCOUNTER — TELEMEDICINE (OUTPATIENT)
Dept: INTERNAL MEDICINE CLINIC | Age: 54
End: 2024-03-26
Payer: COMMERCIAL

## 2024-03-26 DIAGNOSIS — J01.10 ACUTE NON-RECURRENT FRONTAL SINUSITIS: Primary | ICD-10-CM

## 2024-03-26 DIAGNOSIS — J06.9 UPPER RESPIRATORY TRACT INFECTION, UNSPECIFIED TYPE: ICD-10-CM

## 2024-03-26 PROCEDURE — 99213 OFFICE O/P EST LOW 20 MIN: CPT | Performed by: PHYSICIAN ASSISTANT

## 2024-03-26 RX ORDER — CEFUROXIME AXETIL 500 MG/1
500 TABLET ORAL EVERY 12 HOURS SCHEDULED
Qty: 14 TABLET | Refills: 0 | Status: SHIPPED | OUTPATIENT
Start: 2024-03-26 | End: 2024-04-02

## 2024-03-26 RX ORDER — ALBUTEROL SULFATE 90 UG/1
2 AEROSOL, METERED RESPIRATORY (INHALATION) EVERY 6 HOURS PRN
Qty: 6.7 G | Refills: 5 | Status: SHIPPED | OUTPATIENT
Start: 2024-03-26

## 2024-03-26 NOTE — PROGRESS NOTES
Virtual Regular Visit    Verification of patient location:    Patient is located at Home in the following state in which I hold an active license [place]      Assessment/Plan:    Problem List Items Addressed This Visit    None  Visit Diagnoses       Acute non-recurrent frontal sinusitis    -  Primary    ceftin bid  probiotic daily  ov in 5-7 days if sx not resolving    Relevant Medications    cefuroxime (CEFTIN) 500 mg tablet    albuterol (Proventil HFA) 90 mcg/act inhaler    Upper respiratory tract infection, unspecified type        may continue mucinex prn  +proair prn    Relevant Medications    cefuroxime (CEFTIN) 500 mg tablet                 Reason for visit is   Chief Complaint   Patient presents with    Virtual Regular Visit     [phone]---cold for over a week, no fever, chest and head congestion, left ear pain, coughing up yellow/green phlegm, been taking OTC mucinex and sinus meds with no relief    HM     Patient states he has cologuard- aware he needs to complete    Virtual Regular Visit          Encounter provider Katheryn Sprague PA-C    Provider located at [facility]  [address] [place] [address]  [id number]  [facility] [id number]      Recent Visits  No visits were found meeting these conditions.  Showing recent visits within past 7 days and meeting all other requirements  Today's Visits  Date Type Provider Dept   03/26/24 Telemedicine Katheryn Sprague PA-C [facility]   Showing today's visits and meeting all other requirements  Future Appointments  No visits were found meeting these conditions.  Showing future appointments within next 150 days and meeting all other requirements       The patient was identified by name and date of birth. Earnestyaron Andre was informed that this is a telemedicine visit and that the visit is being conducted through the Epic Embedded platform. He agrees to proceed..  My office door was closed. No one  else was in the room.  He acknowledged consent and understanding of privacy and security of the video platform. The patient has agreed to participate and understands they can discontinue the visit at any time.    Patient is aware this is a billable service.     Subjective  Earnest Andre is a 53 y.o. male c/o sinus pressure and cough x 1 week.      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 which he believes is what exacerbated this  Pt states he is getting easily winded since yesterday with exertion and feels more fatigued as well  Denies fever, chills or night sweats  Denies sore throat or ear pain  Has been using mucinex which initially helped but pt states this hasn't been doing much recently          Past Medical History:   Diagnosis Date    Hypertension        Past Surgical History:   Procedure Laterality Date    HERNIA REPAIR         Current Outpatient Medications   Medication Sig Dispense Refill    albuterol (Proventil HFA) 90 mcg/act inhaler Inhale 2 puffs every 6 (six) hours as needed for wheezing 6.7 g 5    ALPRAZolam (XANAX) 1 mg tablet Take 1.5 tablets (1.5 mg total) by mouth daily as needed for anxiety 45 tablet 0    cefuroxime (CEFTIN) 500 mg tablet Take 1 tablet (500 mg total) by mouth every 12 (twelve) hours for 7 days 14 tablet 0    EPINEPHrine (EpiPen 2-Tim) 0.3 mg/0.3 mL SOAJ Inject 0.3 mL (0.3 mg total) into a muscle once for 1 dose 0.6 mL 0    fenofibrate (TRICOR) 145 mg tablet Take 1 tablet (145 mg total) by mouth daily 90 tablet 1    losartan (COZAAR) 100 MG tablet Take 1 tablet (100 mg total) by mouth daily 90 tablet 1    rosuvastatin (CRESTOR) 5 mg tablet Take 1 tablet (5 mg total) by mouth daily 90 tablet 1    triamcinolone (KENALOG) 0.1 % ointment Apply topically 2 (two) times a day (Patient taking differently: Apply topically 2 (two) times a day prn) 80 g 0     No current facility-administered medications for  this visit.        Allergies   Allergen Reactions    Honey Bee Venom Swelling    Other Cough     Seasonal- cough, sneezing, congestion       Review of Systems   Constitutional:  Positive for activity change and fatigue. Negative for appetite change, chills, diaphoresis and fever.   HENT:  Positive for congestion, postnasal drip, sinus pressure and sinus pain. Negative for ear pain and sore throat.    Eyes:  Negative for pain and redness.   Respiratory:  Positive for cough and shortness of breath. Negative for chest tightness and wheezing.    Cardiovascular:  Negative for chest pain and leg swelling.   Gastrointestinal:  Negative for abdominal pain, diarrhea and nausea.   Musculoskeletal:  Negative for arthralgias, gait problem and myalgias.   Skin:  Negative for rash.   Neurological:  Negative for dizziness, light-headedness and headaches.   Psychiatric/Behavioral:  Negative for sleep disturbance. The patient is not nervous/anxious.        Video Exam    Vitals:       Physical Exam  Vitals reviewed.   Constitutional:       General: He is not in acute distress.  Pulmonary:      Effort: Pulmonary effort is normal. No respiratory distress.      Breath sounds: No wheezing.   Neurological:      General: No focal deficit present.      Mental Status: He is alert.   Psychiatric:         Mood and Affect: Mood normal.          Visit Time  Total Visit Duration: 15

## 2024-07-01 ENCOUNTER — TELEPHONE (OUTPATIENT)
Dept: INTERNAL MEDICINE CLINIC | Age: 54
End: 2024-07-01

## 2024-07-01 NOTE — TELEPHONE ENCOUNTER
Patient is aware you are out of the office and is okay with waiting until you return    Patient would like labs ordered for caring starts with you : A1c & lipid panel      Please advise, thank you

## 2024-07-09 DIAGNOSIS — Z13.228 SCREENING FOR METABOLIC DISORDER: ICD-10-CM

## 2024-07-09 DIAGNOSIS — E78.1 HYPERTRIGLYCERIDEMIA: Primary | ICD-10-CM

## 2024-07-17 ENCOUNTER — APPOINTMENT (OUTPATIENT)
Dept: LAB | Facility: CLINIC | Age: 54
End: 2024-07-17
Payer: COMMERCIAL

## 2024-07-17 DIAGNOSIS — F41.1 GAD (GENERALIZED ANXIETY DISORDER): ICD-10-CM

## 2024-07-17 DIAGNOSIS — E78.2 MIXED HYPERLIPIDEMIA: ICD-10-CM

## 2024-07-17 DIAGNOSIS — I10 ESSENTIAL HYPERTENSION: ICD-10-CM

## 2024-07-17 DIAGNOSIS — Z00.8 ENCOUNTER FOR OTHER GENERAL EXAMINATION: ICD-10-CM

## 2024-07-17 LAB
ALBUMIN SERPL BCG-MCNC: 4 G/DL (ref 3.5–5)
ALP SERPL-CCNC: 99 U/L (ref 34–104)
ALT SERPL W P-5'-P-CCNC: 70 U/L (ref 7–52)
ANION GAP SERPL CALCULATED.3IONS-SCNC: 11 MMOL/L (ref 4–13)
AST SERPL W P-5'-P-CCNC: 50 U/L (ref 13–39)
BASOPHILS # BLD AUTO: 0.07 THOUSANDS/ÂΜL (ref 0–0.1)
BASOPHILS NFR BLD AUTO: 1 % (ref 0–1)
BILIRUB SERPL-MCNC: 0.6 MG/DL (ref 0.2–1)
BUN SERPL-MCNC: 19 MG/DL (ref 5–25)
CALCIUM SERPL-MCNC: 9.5 MG/DL (ref 8.4–10.2)
CHLORIDE SERPL-SCNC: 99 MMOL/L (ref 96–108)
CHOLEST SERPL-MCNC: 244 MG/DL
CO2 SERPL-SCNC: 28 MMOL/L (ref 21–32)
CREAT SERPL-MCNC: 1.05 MG/DL (ref 0.6–1.3)
EOSINOPHIL # BLD AUTO: 0.2 THOUSAND/ÂΜL (ref 0–0.61)
EOSINOPHIL NFR BLD AUTO: 3 % (ref 0–6)
ERYTHROCYTE [DISTWIDTH] IN BLOOD BY AUTOMATED COUNT: 12.4 % (ref 11.6–15.1)
EST. AVERAGE GLUCOSE BLD GHB EST-MCNC: 114 MG/DL
GFR SERPL CREATININE-BSD FRML MDRD: 81 ML/MIN/1.73SQ M
GLUCOSE SERPL-MCNC: 174 MG/DL (ref 65–140)
HBA1C MFR BLD: 5.6 %
HCT VFR BLD AUTO: 45.7 % (ref 36.5–49.3)
HDLC SERPL-MCNC: 47 MG/DL
HGB BLD-MCNC: 15.4 G/DL (ref 12–17)
IMM GRANULOCYTES # BLD AUTO: 0.02 THOUSAND/UL (ref 0–0.2)
IMM GRANULOCYTES NFR BLD AUTO: 0 % (ref 0–2)
LYMPHOCYTES # BLD AUTO: 1.72 THOUSANDS/ÂΜL (ref 0.6–4.47)
LYMPHOCYTES NFR BLD AUTO: 29 % (ref 14–44)
MCH RBC QN AUTO: 30.5 PG (ref 26.8–34.3)
MCHC RBC AUTO-ENTMCNC: 33.7 G/DL (ref 31.4–37.4)
MCV RBC AUTO: 91 FL (ref 82–98)
MONOCYTES # BLD AUTO: 0.47 THOUSAND/ÂΜL (ref 0.17–1.22)
MONOCYTES NFR BLD AUTO: 8 % (ref 4–12)
NEUTROPHILS # BLD AUTO: 3.55 THOUSANDS/ÂΜL (ref 1.85–7.62)
NEUTS SEG NFR BLD AUTO: 59 % (ref 43–75)
NONHDLC SERPL-MCNC: 197 MG/DL
NRBC BLD AUTO-RTO: 0 /100 WBCS
PLATELET # BLD AUTO: 242 THOUSANDS/UL (ref 149–390)
PMV BLD AUTO: 11.1 FL (ref 8.9–12.7)
POTASSIUM SERPL-SCNC: 3.8 MMOL/L (ref 3.5–5.3)
PROT SERPL-MCNC: 7.1 G/DL (ref 6.4–8.4)
RBC # BLD AUTO: 5.05 MILLION/UL (ref 3.88–5.62)
SODIUM SERPL-SCNC: 138 MMOL/L (ref 135–147)
TRIGL SERPL-MCNC: 508 MG/DL
WBC # BLD AUTO: 6.03 THOUSAND/UL (ref 4.31–10.16)

## 2024-07-17 PROCEDURE — 36415 COLL VENOUS BLD VENIPUNCTURE: CPT

## 2024-07-17 PROCEDURE — 85025 COMPLETE CBC W/AUTO DIFF WBC: CPT

## 2024-07-17 PROCEDURE — 83036 HEMOGLOBIN GLYCOSYLATED A1C: CPT

## 2024-07-17 PROCEDURE — 80053 COMPREHEN METABOLIC PANEL: CPT

## 2024-07-17 PROCEDURE — 80061 LIPID PANEL: CPT

## 2024-07-18 ENCOUNTER — OFFICE VISIT (OUTPATIENT)
Dept: INTERNAL MEDICINE CLINIC | Age: 54
End: 2024-07-18
Payer: COMMERCIAL

## 2024-07-18 VITALS
TEMPERATURE: 98.9 F | HEIGHT: 73 IN | WEIGHT: 263 LBS | SYSTOLIC BLOOD PRESSURE: 136 MMHG | OXYGEN SATURATION: 95 % | DIASTOLIC BLOOD PRESSURE: 80 MMHG | HEART RATE: 97 BPM | BODY MASS INDEX: 34.85 KG/M2

## 2024-07-18 DIAGNOSIS — M47.816 SPONDYLOSIS OF LUMBAR JOINT: ICD-10-CM

## 2024-07-18 DIAGNOSIS — F41.1 GAD (GENERALIZED ANXIETY DISORDER): ICD-10-CM

## 2024-07-18 DIAGNOSIS — M43.00 SPONDYLOLYSIS: ICD-10-CM

## 2024-07-18 DIAGNOSIS — E78.1 HYPERTRIGLYCERIDEMIA: Primary | ICD-10-CM

## 2024-07-18 DIAGNOSIS — S39.012D STRAIN OF LUMBAR PARASPINOUS MUSCLE, SUBSEQUENT ENCOUNTER: ICD-10-CM

## 2024-07-18 DIAGNOSIS — E78.2 MIXED HYPERLIPIDEMIA: ICD-10-CM

## 2024-07-18 DIAGNOSIS — Z23 NEED FOR SHINGLES VACCINE: ICD-10-CM

## 2024-07-18 DIAGNOSIS — Z23 ENCOUNTER FOR IMMUNIZATION: ICD-10-CM

## 2024-07-18 DIAGNOSIS — I10 ESSENTIAL HYPERTENSION: ICD-10-CM

## 2024-07-18 PROCEDURE — 99214 OFFICE O/P EST MOD 30 MIN: CPT | Performed by: PHYSICIAN ASSISTANT

## 2024-07-18 PROCEDURE — 90471 IMMUNIZATION ADMIN: CPT

## 2024-07-18 PROCEDURE — 90750 HZV VACC RECOMBINANT IM: CPT

## 2024-07-18 RX ORDER — METHOCARBAMOL 500 MG/1
500 TABLET, FILM COATED ORAL
Qty: 90 TABLET | Refills: 1 | Status: SHIPPED | OUTPATIENT
Start: 2024-07-18

## 2024-07-18 NOTE — PROGRESS NOTES
Assessment/Plan:         Diagnoses and all orders for this visit:    Hypertriglyceridemia  Comments:  resume fenofibrate  labs 3 months  call if anxiety sx worsen  Orders:  -     Comprehensive metabolic panel; Future  -     Triglycerides; Future    Essential hypertension  Comments:  improved, stay off losartan for now  montior bp    Mixed hyperlipidemia  Comments:  will consider adding statin back if tolerates fenofibrate  will hold for now and repeat lfts as well  Orders:  -     Comprehensive metabolic panel; Future    DELICIA (generalized anxiety disorder)  -     Comprehensive metabolic panel; Future    Spondylolysis    Spondylosis of lumbar joint  -     Ambulatory referral to Spine & Pain Management; Future    Strain of lumbar paraspinous muscle, subsequent encounter  Comments:  may use robaxin qhs  Orders:  -     methocarbamol (ROBAXIN) 500 mg tablet; Take 1 tablet (500 mg total) by mouth daily at bedtime as needed for muscle spasms    Encounter for immunization    Need for shingles vaccine  -     Zoster Vaccine Recombinant IM          Subjective:      Patient ID: Earnest Andre is a 53 y.o. male.    54 y/o male with hx of hld, hypertrigs, anxiety, htn presents for f/u  Pt states he was feeling very anxious and having mild agoraphobia and felt it was a SE from his medications so he stopped everything approx 1 month ago  Labs reviewed with pt   Chol elevated, trigs elevated  Fbs elevated but hga1c 5.6  Lft mildly elevated, discussed cutting down on alcohol     Pt c/o low back pain, worse with walking, pain radiates down RLE  Pt seeing chiropractor for this  Pt states it affects his sleep in particular   Has tried ibuprofen prn with mild relief               The following portions of the patient's history were reviewed and updated as appropriate: allergies, current medications, past family history, past medical history, past social history, past surgical history, and problem list.    Review of Systems   Constitutional:   Negative for activity change, appetite change, chills and fever.   HENT:  Negative for congestion and sinus pressure.    Eyes:  Negative for pain and redness.   Respiratory:  Negative for cough and shortness of breath.    Cardiovascular:  Negative for chest pain and leg swelling.   Gastrointestinal:  Negative for abdominal pain, constipation, diarrhea and nausea.   Musculoskeletal:  Positive for arthralgias. Negative for gait problem and joint swelling.   Skin:  Negative for rash.   Neurological:  Negative for dizziness, light-headedness and headaches.   Psychiatric/Behavioral:  Negative for sleep disturbance. The patient is nervous/anxious.          Past Medical History:   Diagnosis Date    Hypertension          Current Outpatient Medications:     albuterol (Proventil HFA) 90 mcg/act inhaler, Inhale 2 puffs every 6 (six) hours as needed for wheezing, Disp: 6.7 g, Rfl: 5    ALPRAZolam (XANAX) 1 mg tablet, Take 1.5 tablets (1.5 mg total) by mouth daily as needed for anxiety, Disp: 45 tablet, Rfl: 0    fenofibrate (TRICOR) 145 mg tablet, Take 1 tablet (145 mg total) by mouth daily, Disp: 90 tablet, Rfl: 1    methocarbamol (ROBAXIN) 500 mg tablet, Take 1 tablet (500 mg total) by mouth daily at bedtime as needed for muscle spasms, Disp: 90 tablet, Rfl: 1    triamcinolone (KENALOG) 0.1 % ointment, Apply topically 2 (two) times a day (Patient taking differently: Apply topically 2 (two) times a day prn), Disp: 80 g, Rfl: 0    EPINEPHrine (EpiPen 2-Tim) 0.3 mg/0.3 mL SOAJ, Inject 0.3 mL (0.3 mg total) into a muscle once for 1 dose, Disp: 0.6 mL, Rfl: 0    Allergies   Allergen Reactions    Honey Bee Venom Swelling    Other Cough     Seasonal- cough, sneezing, congestion       Social History   Past Surgical History:   Procedure Laterality Date    HERNIA REPAIR       Family History   Problem Relation Age of Onset    COPD Mother     Hypertension Father     Diabetes Father     Lung cancer Father     COPD Father     Vision loss  "Father        Objective:  /80 (BP Location: Left arm, Patient Position: Sitting, Cuff Size: Large)   Pulse 97   Temp 98.9 °F (37.2 °C) (Temporal)   Ht 6' 1\" (1.854 m)   Wt 119 kg (263 lb)   SpO2 95%   BMI 34.70 kg/m²        Physical Exam  Vitals reviewed.   HENT:      Head: Normocephalic and atraumatic.      Right Ear: Tympanic membrane, ear canal and external ear normal.      Left Ear: Tympanic membrane, ear canal and external ear normal.      Nose: Nose normal.      Mouth/Throat:      Mouth: Mucous membranes are moist.   Eyes:      General:         Right eye: No discharge.         Left eye: No discharge.      Extraocular Movements: Extraocular movements intact.      Conjunctiva/sclera: Conjunctivae normal.      Pupils: Pupils are equal, round, and reactive to light.   Cardiovascular:      Rate and Rhythm: Normal rate and regular rhythm.   Pulmonary:      Effort: Pulmonary effort is normal. No respiratory distress.      Breath sounds: Normal breath sounds. No wheezing or rales.   Musculoskeletal:         General: No swelling. Normal range of motion.      Cervical back: Normal range of motion.      Right lower leg: No edema.      Left lower leg: No edema.   Lymphadenopathy:      Cervical: No cervical adenopathy.   Skin:     General: Skin is warm.      Findings: No erythema or rash.   Neurological:      General: No focal deficit present.      Mental Status: He is alert and oriented to person, place, and time.         "

## 2024-08-13 ENCOUNTER — HOSPITAL ENCOUNTER (EMERGENCY)
Facility: HOSPITAL | Age: 54
Discharge: HOME | End: 2024-08-13
Attending: EMERGENCY MEDICINE
Payer: OTHER MISCELLANEOUS

## 2024-08-13 VITALS
RESPIRATION RATE: 18 BRPM | DIASTOLIC BLOOD PRESSURE: 118 MMHG | OXYGEN SATURATION: 96 % | SYSTOLIC BLOOD PRESSURE: 181 MMHG | HEART RATE: 98 BPM | TEMPERATURE: 97.7 F

## 2024-08-13 DIAGNOSIS — S61.219A FINGER LACERATION: Primary | ICD-10-CM

## 2024-08-13 PROCEDURE — 99283 EMERGENCY DEPT VISIT LOW MDM: CPT | Performed by: EMERGENCY MEDICINE

## 2024-08-13 PROCEDURE — 99282 EMERGENCY DEPT VISIT SF MDM: CPT

## 2024-08-13 PROCEDURE — 12001 RPR S/N/AX/GEN/TRNK 2.5CM/<: CPT | Performed by: EMERGENCY MEDICINE

## 2024-08-13 NOTE — ED ATTENDING ATTESTATION
8/13/2024  I, Michelle Lundberg DO, saw and evaluated the patient. I have discussed the patient with the resident/non-physician practitioner and agree with the resident's/non-physician practitioner's findings, Plan of Care, and MDM as documented in the resident's/non-physician practitioner's note, except where noted. All available labs and Radiology studies were reviewed.  I was present for key portions of any procedure(s) performed by the resident/non-physician practitioner and I was immediately available to provide assistance.       At this point I agree with the current assessment done in the Emergency Department.  I have conducted an independent evaluation of this patient a history and physical is as follows:    Chief Complaint   Patient presents with    Finger Laceration     Pt was cutting trees today and cut L fifth digit, bleeding controlled in triage      53-year-old male presents with laceration to his left fifth digit.  Patient was cutting trees and accidentally cut his fifth finger at the tip.  Denies other injuries or complaints.  On exam-no acute distress, heart regular, no respiratory distress.  6 mm laceration to the tip of the fifth finger.  Plan-patient is up-to-date with tetanus, will use skin glue to repair laceration    ED Course         Critical Care Time  Procedures

## 2024-08-13 NOTE — DISCHARGE INSTRUCTIONS
Take down gauze dressing tomorrow, steristrip will come off by itself within a week. Ok to shower and bathe normally starting tomorrow.   Return for increasing or spreading redness, pain, or warmth, or new numbness and tingling not relieved by removing the gauze wrap.

## 2024-08-14 ENCOUNTER — TELEMEDICINE (OUTPATIENT)
Dept: INTERNAL MEDICINE CLINIC | Age: 54
End: 2024-08-14
Payer: COMMERCIAL

## 2024-08-14 DIAGNOSIS — E78.2 MIXED HYPERLIPIDEMIA: ICD-10-CM

## 2024-08-14 DIAGNOSIS — S39.012D STRAIN OF LUMBAR PARASPINOUS MUSCLE, SUBSEQUENT ENCOUNTER: ICD-10-CM

## 2024-08-14 DIAGNOSIS — Z12.11 SCREENING FOR MALIGNANT NEOPLASM OF COLON: ICD-10-CM

## 2024-08-14 DIAGNOSIS — Z13.228 SCREENING FOR METABOLIC DISORDER: ICD-10-CM

## 2024-08-14 DIAGNOSIS — I10 ESSENTIAL HYPERTENSION: Primary | ICD-10-CM

## 2024-08-14 PROCEDURE — 99213 OFFICE O/P EST LOW 20 MIN: CPT | Performed by: PHYSICIAN ASSISTANT

## 2024-08-14 RX ORDER — LOSARTAN POTASSIUM 50 MG/1
50 TABLET ORAL DAILY
Start: 2024-08-14

## 2024-08-14 RX ORDER — METHOCARBAMOL 500 MG/1
500 TABLET, FILM COATED ORAL 3 TIMES DAILY PRN
Qty: 90 TABLET | Refills: 1 | Status: SHIPPED | OUTPATIENT
Start: 2024-08-14

## 2024-08-14 NOTE — PROGRESS NOTES
Virtual Regular Visit  Name: Earnest Andre      : 1970      MRN: [id number]  Encounter Provider: Katheryn Sprague PA-C  Encounter Date: 2024   Encounter department: [facility]    Verification of patient location:    Patient is located at Home in the following state in which I hold an active license [place]    Assessment & Plan   1. Essential hypertension  Comments:  Resume losartan 50 mg daily  Labs in 2 weeks  BP check at home -goal 130/80 and below  Follow-up in office 1 month BP check  Orders:  -     CBC and differential; Future  -     Comprehensive metabolic panel; Future  -     losartan (COZAAR) 50 mg tablet; Take 1 tablet (50 mg total) by mouth daily  2. Screening for malignant neoplasm of colon  3. Mixed hyperlipidemia  Comments:  Continue fenofibrate, repeat triglycerides, low-cholesterol diet  Orders:  -     CBC and differential; Future  -     Comprehensive metabolic panel; Future  4. Screening for metabolic disorder  -     CBC and differential; Future  -     Comprehensive metabolic panel; Future         Encounter provider Katheryn Sprague PA-C    The patient was identified by name and date of birth. Earnest Andre was informed that this is a telemedicine visit and that the visit is being conducted through the Epic Embedded platform. He agrees to proceed..  My office door was closed. No one else was in the room.  He acknowledged consent and understanding of privacy and security of the video platform. The patient has agreed to participate and understands they can discontinue the visit at any time.    Patient is aware this is a billable service.     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 and reports BP was 148/80 and was only approved for 3 months   Since then resumed losartan 50mg daily starting Monday night  He was previously on losartan but stopped a few months ago when he felt that  the combination of his medications may be causing anxiety  He was since seen in the office and resumed his fenofibrate and has been tolerating this  Yesterday, sustained finger lac at work and BP very high in ED however pt was under stress and pain at the time   Pt will continue losartan and will check BP at home - believes his father has one he can use   Pt has been avoiding salt   Agrees to do repeat labs in 1-2 weeks          Review of Systems   Constitutional:  Negative for activity change, appetite change, diaphoresis and fever.   HENT:  Negative for congestion and sore throat.    Respiratory:  Negative for cough and shortness of breath.    Cardiovascular:  Negative for chest pain and leg swelling.   Gastrointestinal:  Negative for abdominal pain, constipation and diarrhea.   Musculoskeletal:  Positive for back pain. Negative for arthralgias.   Skin:  Positive for wound (finger).   Neurological:  Negative for dizziness, light-headedness and headaches.   Psychiatric/Behavioral:  Negative for sleep disturbance. The patient is not nervous/anxious.      Medical History Reviewed by provider this encounter:  Tobacco  Allergies  Meds  Problems  Med Hx  Surg Hx  Fam Hx       Objective     There were no vitals taken for this visit.  Physical Exam  Vitals reviewed.   Constitutional:       General: He is not in acute distress.  HENT:      Head: Normocephalic.      Nose: Nose normal.   Eyes:      Conjunctiva/sclera: Conjunctivae normal.   Pulmonary:      Effort: Pulmonary effort is normal. No respiratory distress.   Musculoskeletal:         General: Signs of injury (L 5th digit lac - wrapped in gauze) present.   Neurological:      General: No focal deficit present.      Mental Status: He is alert.   Psychiatric:         Mood and Affect: Mood normal.         Visit Time  Total Visit Duration: 20 min

## 2024-08-15 NOTE — ED PROVIDER NOTES
History  Chief Complaint   Patient presents with    Finger Laceration     Pt was cutting trees today and cut L fifth digit, bleeding controlled in triage        Finger Laceration    Pt is 53M seen for cut to left little finger less than an hour ago.      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  Had trouble stopping the bleeding but had gauze bandage applied which helped.  Denies sensory abnormalities, numbness, or tingling.  No skin duskiness coloration.  No other fingers or areas injured.  Had his last tetanus shot in 2020.      Prior to Admission Medications   Prescriptions Last Dose Informant Patient Reported? Taking?   ALPRAZolam (XANAX) 1 mg tablet  Self No No   Sig: Take 1.5 tablets (1.5 mg total) by mouth daily as needed for anxiety   EPINEPHrine (EpiPen 2-Tim) 0.3 mg/0.3 mL SOAJ   No No   Sig: Inject 0.3 mL (0.3 mg total) into a muscle once for 1 dose   albuterol (Proventil HFA) 90 mcg/act inhaler   No No   Sig: Inhale 2 puffs every 6 (six) hours as needed for wheezing   fenofibrate (TRICOR) 145 mg tablet  Self No No   Sig: Take 1 tablet (145 mg total) by mouth daily   triamcinolone (KENALOG) 0.1 % ointment  Self No No   Sig: Apply topically 2 (two) times a day   Patient taking differently: Apply topically 2 (two) times a day prn      Facility-Administered Medications: None       Past Medical History:   Diagnosis Date    Hypertension        Past Surgical History:   Procedure Laterality Date    HERNIA REPAIR         Family History   Problem Relation Age of Onset    COPD Mother     Hypertension Father     Diabetes Father     Lung cancer Father     COPD Father     Vision loss Father      I have reviewed and agree with the history as documented.    E-Cigarette/Vaping    E-Cigarette Use Never User      E-Cigarette/Vaping Substances    Nicotine No     THC No     CBD No     Flavoring No     Other No      Unknown No      Social History     Tobacco Use    Smoking status: Never     Passive exposure: Past    Smokeless tobacco: Former     Types: Chew     Quit date: 10/25/2023    Tobacco comments:     Stopped chewing  tobacco 10/23   Vaping Use    Vaping status: Never Used   Substance Use Topics    Alcohol use: Yes     Comment: social    Drug use: Never        Review of Systems  As per HPI    Physical Exam  ED Triage Vitals [08/13/24 1157]   Temperature Pulse Respirations Blood Pressure SpO2   97.7 °F (36.5 °C) 98 18 (!) 181/118 96 %      Temp Source Heart Rate Source Patient Position - Orthostatic VS BP Location FiO2 (%)   Tympanic Monitor Sitting Left arm --      Pain Score       --             Orthostatic Vital Signs  Vitals:    08/13/24 1157   BP: (!) 181/118   Pulse: 98   Patient Position - Orthostatic VS: Sitting       Physical Exam  Gen: No acute distress; appears stated age  Neuro: Alert and oriented, follows commands  HEENT: Atraumatic, normocephalic  CV: Regular rate  Pulm: No increased work of breathing  MSK: Small 6 mm cut wound extending into the dermis located at the radial aspect of the distal left little finger, urgently hemostatic but bleeding after patient applied pressure to the wound.  No debris within wound.  Normal sensation throughout finger/fingertip, minimal associated pain/tenderness.  Normal ROM at PIP and DIP.  Warm, pink, well-perfused, with cap refill less than 2 seconds.              ED Medications  Medications - No data to display    Diagnostic Studies  Results Reviewed       None                   No orders to display         Procedures  Procedures      ED Course  ED Course as of 08/15/24 1833   Tue Aug 13, 2024   1227 Cut finger today while cutting trees, garden shear to left little finger tip. Neurovasc intact, small 0.5cm linear cut wound. Irrigated with tap water. Steristrip and exofin applied, dressed w gauze. Last tdap 2020. Ok to dc home, take down dressing tomorrow, steristrip will  come off by itself within a week.             Medical Decision Making      53M with small superficial cut to L little finger. Irrigated copiously with tap water, no debris or foreign bodies identified in the wound.  Closed with skin glue and steri strip.  Patient instructed that he may take down the overlying gauze dressing tomorrow, and after that may shower and get the wound wet but not soak.  The Steri-Strip and glue will fall off on their own in about a week.  Patient instructed to return to the ED for worsening/spreading redness, swelling, pain, or drainage of pus from wound.      Disposition  Final diagnoses:   Finger laceration     Time reflects when diagnosis was documented in both MDM as applicable and the Disposition within this note       Time User Action Codes Description Comment    8/13/2024 12:28 PM Madison Dial [S61.219A] Finger laceration           ED Disposition       ED Disposition   Discharge    Condition   Stable    Date/Time   Tue Aug 13, 2024 12:28 PM    Comment   Earnest Andre discharge to home/self care.                   Follow-up Information       Follow up With Specialties Details Why Contact Info    Katheryn Sprague PA-C Physician Assistant  As needed [id number] [address]  [phone]              Discharge Medication List as of 8/13/2024 12:30 PM        CONTINUE these medications which have NOT CHANGED    Details   albuterol (Proventil HFA) 90 mcg/act inhaler Inhale 2 puffs every 6 (six) hours as needed for wheezing, Starting Tue 3/26/2024, Normal      ALPRAZolam (XANAX) 1 mg tablet Take 1.5 tablets (1.5 mg total) by mouth daily as needed for anxiety, Starting Tue 2/27/2024, Normal      EPINEPHrine (EpiPen 2-Tim) 0.3 mg/0.3 mL SOAJ Inject 0.3 mL (0.3 mg total) into a muscle once for 1 dose, Starting Thu 1/11/2024, Normal      fenofibrate (TRICOR) 145 mg tablet Take 1 tablet (145 mg total) by mouth daily, Starting Sat 2/10/2024, Normal      triamcinolone  (KENALOG) 0.1 % ointment Apply topically 2 (two) times a day, Starting Thu 9/15/2022, Normal      methocarbamol (ROBAXIN) 500 mg tablet Take 1 tablet (500 mg total) by mouth daily at bedtime as needed for muscle spasms, Starting Thu 7/18/2024, Normal           No discharge procedures on file.    PDMP Review         Value Time User    PDMP Reviewed  Yes 2/15/2024  4:46 PM Katheryn Sprague PA-C             ED Provider  Attending physically available and evaluated Earnest Andre. I managed the patient along with the ED Attending.    Electronically Signed by  Madison Dial MD, MPH  Emergency Medicine Resident  [facility]  [phone]           Madison Dial MD  08/15/24 6446

## 2024-08-21 ENCOUNTER — APPOINTMENT (OUTPATIENT)
Dept: RADIOLOGY | Facility: CLINIC | Age: 54
End: 2024-08-21
Payer: COMMERCIAL

## 2024-08-21 ENCOUNTER — CONSULT (OUTPATIENT)
Dept: PAIN MEDICINE | Facility: CLINIC | Age: 54
End: 2024-08-21
Payer: COMMERCIAL

## 2024-08-21 VITALS
WEIGHT: 270 LBS | HEIGHT: 73 IN | SYSTOLIC BLOOD PRESSURE: 129 MMHG | DIASTOLIC BLOOD PRESSURE: 93 MMHG | BODY MASS INDEX: 35.78 KG/M2

## 2024-08-21 DIAGNOSIS — M54.16 LUMBAR RADICULOPATHY: Primary | ICD-10-CM

## 2024-08-21 DIAGNOSIS — M47.816 LUMBAR SPONDYLOSIS: ICD-10-CM

## 2024-08-21 DIAGNOSIS — M43.16 SPONDYLOLISTHESIS, LUMBAR REGION: ICD-10-CM

## 2024-08-21 DIAGNOSIS — M54.16 LUMBAR RADICULOPATHY: ICD-10-CM

## 2024-08-21 PROCEDURE — 99244 OFF/OP CNSLTJ NEW/EST MOD 40: CPT | Performed by: ANESTHESIOLOGY

## 2024-08-21 PROCEDURE — 72114 X-RAY EXAM L-S SPINE BENDING: CPT

## 2024-08-21 NOTE — PROGRESS NOTES
Assessment/Plan  1. Spondylolisthesis, lumbar region    2. Lumbar spondylosis    3. Lumbar radiculopathy       Patient is a 53M presents with low back pain radiating to right lateral calf area over the past several months. X-ray of lumbar spine done on November 2023 and reviewed by me showing disc space narrowing at L5-S1 and grade 1 listhesis of L5 on S1.    1.  Will send patient to PT  2.  Will obtain x-ray lumbar spine with flexion and extension views  3.  Will prescribe diclofenac 50 mg twice daily  4. Advised follow up in 6 weeks     My impressions and treatment recommendations were discussed in detail with the patient who verbalized understanding and had no further questions.  Discharge instructions were provided. I personally saw and examined the patient and I agree with the above discussed plan of care.    No orders of the defined types were placed in this encounter.    No orders of the defined types were placed in this encounter.      History of Present Illness    Earnest Andre is a 53 y.o. male presents with low back pain radiating to right lateral calf area the past several months.  Patient denies any inciting event.  Patient states that intensity is moderate to severe, rated as 9 out of 10 in severity, states that the pain occurs nearly intermittently, and has associated numbness.  Patient states that he has  occasionally been taking Robaxin with minimal relief in symptoms.    I have personally reviewed and/or updated the patient's past medical history, past surgical history, family history, social history, current medications, allergies, and vital signs today.     Review of Systems   Constitutional:  Negative for fever and unexpected weight change.   HENT:  Negative for trouble swallowing.    Eyes:  Negative for visual disturbance.   Respiratory:  Negative for shortness of breath and wheezing.    Cardiovascular:  Negative for chest pain and palpitations.   Gastrointestinal:  Negative for constipation,  diarrhea, nausea and vomiting.   Endocrine: Negative for cold intolerance, heat intolerance and polydipsia.   Genitourinary:  Negative for difficulty urinating and frequency.   Musculoskeletal:  Negative for arthralgias, gait problem, joint swelling and myalgias.   Skin:  Negative for rash.   Neurological:  Negative for dizziness, seizures, syncope, weakness and headaches.   Hematological:  Does not bruise/bleed easily.   Psychiatric/Behavioral:  Negative for dysphoric mood.    All other systems reviewed and are negative.  General: no fevers, chills, infections  Neuro: no saddle anesthesia  GI: no changes in bowel habits  : no changes in bladder habits  Hem: no bleeding/anticoagulant use      Patient Active Problem List   Diagnosis    Acute pain of left knee    Continuous opioid dependence (HCC)    Essential hypertension    Hyperlipidemia    Lumbar radiculopathy    Spondylolisthesis, lumbar region    Lumbar spondylosis       Past Medical History:   Diagnosis Date    Hypertension        Past Surgical History:   Procedure Laterality Date    HERNIA REPAIR         Family History   Problem Relation Age of Onset    COPD Mother     Hypertension Father     Diabetes Father     Lung cancer Father     COPD Father     Vision loss Father        Social History     Occupational History    Not on file   Tobacco Use    Smoking status: Never     Passive exposure: Past    Smokeless tobacco: Former     Types: Chew     Quit date: 10/25/2023    Tobacco comments:     Stopped chewing  tobacco 10/23   Vaping Use    Vaping status: Never Used   Substance and Sexual Activity    Alcohol use: Yes     Comment: social    Drug use: Never    Sexual activity: Not on file       Current Outpatient Medications on File Prior to Visit   Medication Sig    fenofibrate (TRICOR) 145 mg tablet Take 1 tablet (145 mg total) by mouth daily    losartan (COZAAR) 50 mg tablet Take 1 tablet (50 mg total) by mouth daily    methocarbamol (ROBAXIN) 500 mg tablet Take  1 tablet (500 mg total) by mouth 3 (three) times a day as needed for muscle spasms    albuterol (Proventil HFA) 90 mcg/act inhaler Inhale 2 puffs every 6 (six) hours as needed for wheezing    ALPRAZolam (XANAX) 1 mg tablet Take 1.5 tablets (1.5 mg total) by mouth daily as needed for anxiety    EPINEPHrine (EpiPen 2-Tim) 0.3 mg/0.3 mL SOAJ Inject 0.3 mL (0.3 mg total) into a muscle once for 1 dose    triamcinolone (KENALOG) 0.1 % ointment Apply topically 2 (two) times a day (Patient not taking: Reported on 8/21/2024)     No current facility-administered medications on file prior to visit.       Allergies   Allergen Reactions    Honey Bee Venom Swelling    Other Cough     Seasonal- cough, sneezing, congestion       Physical Exam    There were no vitals taken for this visit.    There were no vitals filed for this visit.  Constitutional: no apparent distress, pleasant, well appearing, does not appear sedated   HEENT: pupils equal and round, NC/AT,  symmetric facial muscles   Neck: supple  Cardiovascular: well perfused, no peripheral cyanosis  Pulmonary: good chest wall excursion, breathing unlabored   Psych: appropriate affect and insight, No agitation. No evidence of aberrant behavior   Skin: No rashes or lesions  Neuro: cranial nerves II-XII grossly intact   MSK:  Inspection: no signs of infection to back  Palpation: No tenderness over the spinous process and paraverteral muscles bilaterally nontender  ROM: no significant rom abnormalities in lumbar spine   MMT 5/5 strength in B/L LE  Sensation to light touch intact B/L L2-S1  DTR: 2+ in B/L patellar and achilles reflex  Special test: + Slump test right   Gait: ambulates unassisted, gait is not antalgic      Imaging  XR spine lumbar minimum 4 views non injury  Status: Final result     PACS Images     Show images for XR spine lumbar minimum 4 views non injury  Study Result    Narrative & Impression   LUMBAR SPINE     INDICATION:   M54.50: Low back pain, unspecified.      COMPARISON:  None     VIEWS:  XR SPINE LUMBAR MINIMUM 4 VIEWS NON INJURY  Images: 5     FINDINGS:     There are 5 non rib bearing lumbar vertebral bodies.     There is no evidence of acute fracture or destructive osseous lesion.     Grade 1 anterolisthesis L5-S1. Moderate degenerative spondylosis L5-S1  Probable bilateral L5 spondylolysis     The pedicles appear intact.     Soft tissues are unremarkable.     IMPRESSION:     No acute lumbar spinal abnormalities identified        Workstation performed: [id number]        Imaging    XR spine lumbar minimum 4 views non injury (Order: [id number]) - 12/11/2023    Result History    XR spine lumbar minimum 4 views non injury (Order #[id number]) on 12/13/2023 - Order Result History Report    Order Report     Order Details

## 2024-08-29 ENCOUNTER — EVALUATION (OUTPATIENT)
Dept: PHYSICAL THERAPY | Facility: REHABILITATION | Age: 54
End: 2024-08-29
Payer: COMMERCIAL

## 2024-08-29 DIAGNOSIS — M43.16 SPONDYLOLISTHESIS, LUMBAR REGION: ICD-10-CM

## 2024-08-29 DIAGNOSIS — M47.816 LUMBAR SPONDYLOSIS: Primary | ICD-10-CM

## 2024-08-29 DIAGNOSIS — M54.16 LUMBAR RADICULOPATHY: ICD-10-CM

## 2024-08-29 PROCEDURE — 97162 PT EVAL MOD COMPLEX 30 MIN: CPT | Performed by: PHYSICAL THERAPIST

## 2024-08-29 NOTE — PROGRESS NOTES
PT Evaluation     Today's date: 2024  Patient name: Earnest Andre  : 1970  MRN: [id number]  Referring provider: Katheryn Sprague PA-C  Dx:   Encounter Diagnosis     ICD-10-CM    1. Lumbar spondylosis  M47.816 Ambulatory referral to Physical Therapy      2. Spondylolisthesis, lumbar region  M43.16 Ambulatory referral to Physical Therapy      3. Lumbar radiculopathy  M54.16 Ambulatory referral to Physical Therapy                     Assessment  Impairments: abnormal coordination, abnormal or restricted ROM, activity intolerance, impaired physical strength and pain with function    Assessment details: Pt is a pleasant 53 y.o. male presenting to outpatient physical therapy with Lumbar spondylosis  (primary encounter diagnosis), Spondylolisthesis, lumbar region, Lumbar radiculopathy.     Pt displays limited and painful active range of motion of lumbar spine, however, does not demonstrate a pattern of signs/symptoms with lower quarter neurologic screen, and does not respond to repeated motion testing. Patient demonstrated greatest impairments in areas of right hip internal and external rotation mobility, decreased hip flexor mobility, and tenderness to lumbopelvic and hip flexor musculature. Upon examination of the right hip, patient asked about use of heavy tool belt, to which patient responded he has worn a gun holster with the gun on his right side for approx 14 years, though stopped because of recent symptoms. Patients presentation suggests possible pathoanatomical diagnosis of meralgia paresthetica. Pt issued HEP. Pt is a good candidate for outpatient physical therapy and would benefit from skilled physical therapy to address limitations and to achieve goals. Thank you for this referral.   Understanding of Dx/Px/POC: good     Prognosis: good    Goals  Short-Term Goals (4 weeks)   1. Patient will decrease worst rating of pain by 25% to improve quality of life.  2. Patient will increase strength by 1/2  MMT to improve quality of life with improved efficiency of daily activities.  3. Patient will improve ROM by 25% indicating improved mobility of affected area.    Long-Term Goals (8 weeks)   1. Patient will decrease pain by 50% at worst in comparison to IE indicating significant reduction in pain and improved quality of life.  2. Patient will demonstrate strength WFL compared to IE levels indicating ability to independently manage pain symptoms to accomplish daily activities.   3. Patient will be independent with HEP with good form accomplished.      Plan  Patient would benefit from: PT eval and skilled PT  Planned modality interventions: cryotherapy and thermotherapy: hydrocollator packs    Planned therapy interventions: IADL retraining, body mechanics training, flexibility, functional ROM exercises, home exercise program, neuromuscular re-education, manual therapy, postural training, strengthening, stretching, therapeutic activities, therapeutic exercise, joint mobilization and IASTM    Frequency: 1x week  Duration in weeks: 8  Treatment plan discussed with: patient        Subjective Evaluation    History of Present Illness  Mechanism of injury: 08/29/24  Pt comes to therapy reporting chronic low back and RLE pain since late 2023. Notes he had approx 4-months of chiropractic care with minimal improvements, except while on the traction machine. Notes he more recently followed up with pain management physician, noting updated radiographs were taken, demonstrating stable appearance from study in 12/2023.   Pt notes his symptoms typically occur while walking, where he experiences a sudden pain in lateral and anterior aspects of right upper thigh. Notes he also finds he needs to constantly adjust his position while sitting, due to discomfort after 5-10 minutes in one position. Notes he has found some relief sleeping with a pillow between his knees and lying on his side. Reports the symptoms are also accompanied by  numbness and tingling. Reports his symptoms do not travel distal to the area of the fibular head on his RLE. Notes one instance of left LE symptoms starting.   Pt reports minimal improvements in symptoms with patches or other modalities. Notes he has found some help taking recently prescribed anti-inflammatory medication. Reports he follows up with pain management in approx 6 weeks.   Pt denies bowel or bladder dysfunction (incontinence or retention), saddle anesthesia, fever, chills, nausea, or vomiting. Pt also denies pain at night or recent unexplained weight loss.  States he works doing [employer] for [employer].   GOALS: relief pain with walking    Patient Goals  Patient goals for therapy: decreased pain, increased motion, independence with ADLs/IADLs and return to sport/leisure activities    Pain  Current pain rating: 3  At worst pain ratin        Objective     Neurological Testing     Sensation     Lumbar   Left   Intact: light touch    Right   Diminished: light touch    Reflexes   Left   Patellar (L4): normal (2+)  Achilles (S1): trace (1+)  Clonus sign: negative    Right   Patellar (L4): trace (1+)  Achilles (S1): trace (1+)  Clonus sign: negative    Additional Neurological Details  24  LOWER EXTREMITY MYOTOMES: Pain noted with testing on right  LOWER EXTREMITY DERMATOMES: Decreased sensation noted in L3 region      Active Range of Motion     Lumbar   Flexion:  Restriction level: minimal  Extension:  with pain Restriction level: maximal  Left lateral flexion:  Restriction level: minimal  Right lateral flexion:  with pain Restriction level: moderate  Left rotation:  with pain Restriction level: minimal  Right rotation:  with pain Restriction level: minimal    Passive Range of Motion     Right Hip   External rotation (90/90): with pain  Internal rotation (90/90): with pain    Additional Passive Range of Motion Details  24  Reproduction of back pain with passive hip IR and ER    Joint Play      Hypomobile: L1, L2, L3 and L4     Tests     Lumbar     Left   Positive slump test.   Negative passive SLR.     Right   Positive passive SLR.   Negative slump test.     Left Hip   Negative DANIEL, FADIR, piriformis, scour and sign of the buttock.   Modified Carlos: Positive.   SLR: Negative.     Right Hip   Positive FADIR and scour.   Negative DANIEL (relief of symptoms), piriformis and sign of the buttock.   Modified Carlos: Positive (significantly more restricted on right).   SLR: Negative.     Additional Tests Details  08/29/24  (+) relief of symptoms with left LE slump; no change on right  TTP over right QL musc.  Repeated lumbar flexion in standing - no change  Repeated lumbar extension in standing - pain!  Repeated lumbar side gliding in standing - increased tingling in foot with right side glide; mild increase in back pain on left  TTP over anterior right hip in area of psoas/ASIS, greater troch             Precautions:   Patient Active Problem List   Diagnosis    Acute pain of left knee    Continuous opioid dependence (HCC)    Essential hypertension    Hyperlipidemia    Lumbar radiculopathy    Spondylolisthesis, lumbar region    Lumbar spondylosis      Allergies   Allergen Reactions    Honey Bee Venom Swelling    Other Cough     Seasonal- cough, sneezing, congestion        Daily Treatment Diary    Date 8/29            FOTO nv            Re-Eval IE            Auth visit # 1            Manuals    Lat hip mobs - R                                                    Neuro Re-Ed                                                                                                Ther Ex    Standing hip abd, ext                          Hip flexor stretch 1/2 kneel c PPT             Hip flexor stretch EOT             LTR to L                          Bridges c PPT             Clamshells c TB             Quadruped kick backs                                       Ther Activity    Treadmill                           Gait  Training                              Modalities                                  Access Code: [email or web address]  URL: [email or web address]  Date: 08/29/2024  Prepared by: Alexandr Spaulding    Exercises  - Modified Carlos Stretch  - 2 x daily - 5 reps - 20 hold  - Half Kneeling Hip Flexor Stretch with Sidebend  - 2 x daily - 5 reps - 20 hold  - Sidelying Quadratus Lumborum Stretch on Table  - 2 x daily - 5 reps - 20 hold

## 2024-09-10 ENCOUNTER — OFFICE VISIT (OUTPATIENT)
Dept: PHYSICAL THERAPY | Facility: REHABILITATION | Age: 54
End: 2024-09-10
Payer: COMMERCIAL

## 2024-09-10 DIAGNOSIS — M47.816 LUMBAR SPONDYLOSIS: Primary | ICD-10-CM

## 2024-09-10 DIAGNOSIS — M54.16 LUMBAR RADICULOPATHY: ICD-10-CM

## 2024-09-10 DIAGNOSIS — M43.16 SPONDYLOLISTHESIS, LUMBAR REGION: ICD-10-CM

## 2024-09-10 PROCEDURE — 97112 NEUROMUSCULAR REEDUCATION: CPT | Performed by: PHYSICAL THERAPIST

## 2024-09-10 PROCEDURE — 97140 MANUAL THERAPY 1/> REGIONS: CPT | Performed by: PHYSICAL THERAPIST

## 2024-09-10 PROCEDURE — 97110 THERAPEUTIC EXERCISES: CPT | Performed by: PHYSICAL THERAPIST

## 2024-09-10 NOTE — PROGRESS NOTES
"Daily Note     Today's date: 9/10/2024  Patient name: Earnest Andre  : 1970  MRN: [id number]  Referring provider: Katheryn Sprague PA-C  Dx:   Encounter Diagnosis     ICD-10-CM    1. Lumbar spondylosis  M47.816       2. Spondylolisthesis, lumbar region  M43.16       3. Lumbar radiculopathy  M54.16                      Subjective: Pt comes to therapy stating he has seen some improvements in his ability to walk and position his hip since starting the home exercises. Notes he was notably sore following the initial evaluation. Reports 1/2 kneeling stretch proved to be too painful to perform.       Objective: See treatment diary below      Assessment: Tolerated treatment well. Favorable responses to manuals. Challenged with bridges, clamshells, and hydrants. Cues to modify movement and reps due to pain/fatigue. Issued updated HEP. Patient exhibited good technique with therapeutic exercises and would benefit from continued PT      Plan: Progress treatment as tolerated.       Precautions:   Patient Active Problem List   Diagnosis    Acute pain of left knee    Continuous opioid dependence (HCC)    Essential hypertension    Hyperlipidemia    Lumbar radiculopathy    Spondylolisthesis, lumbar region    Lumbar spondylosis      Allergies   Allergen Reactions    Honey Bee Venom Swelling    Other Cough     Seasonal- cough, sneezing, congestion        Daily Treatment Diary    Date 8/29 9/10           FOTO nv            Re-Eval IE            Auth visit # 1            Manuals    Lat hip mobs - R  APRIL gr 4           RLE LAD  APRIL                                     Neuro Re-Ed                                                                                                Ther Ex    Standing hip abd, ext  5\"x10 ea B                        Hip flexor stretch EOT  20\"x5           LTR to L  20\"x5                        Bridges c PPT  5\"x10           Clamshells c TB  5\"x10           Quadruped kick backs  5\"x10                          "            Ther Activity    Treadmill   5' 1.8 mph                        Gait Training                              Modalities                                  Access Code: [email or web address]  URL: [email or web address]  Date: 08/29/2024  Prepared by: Alexandr Spaulding    Exercises  - Modified Carlos Stretch  - 2 x daily - 5 reps - 20 hold  - Half Kneeling Hip Flexor Stretch with Sidebend  - 2 x daily - 5 reps - 20 hold  - Sidelying Quadratus Lumborum Stretch on Table  - 2 x daily - 5 reps - 20 hold

## 2024-09-17 ENCOUNTER — APPOINTMENT (OUTPATIENT)
Dept: PHYSICAL THERAPY | Facility: REHABILITATION | Age: 54
End: 2024-09-17
Payer: COMMERCIAL

## 2024-09-17 ENCOUNTER — APPOINTMENT (OUTPATIENT)
Dept: LAB | Facility: CLINIC | Age: 54
End: 2024-09-17
Payer: COMMERCIAL

## 2024-09-17 DIAGNOSIS — E78.2 MIXED HYPERLIPIDEMIA: ICD-10-CM

## 2024-09-17 DIAGNOSIS — E78.1 HYPERTRIGLYCERIDEMIA: ICD-10-CM

## 2024-09-17 DIAGNOSIS — Z13.228 SCREENING FOR METABOLIC DISORDER: ICD-10-CM

## 2024-09-17 DIAGNOSIS — I10 ESSENTIAL HYPERTENSION: ICD-10-CM

## 2024-09-17 LAB
ALBUMIN SERPL BCG-MCNC: 4.4 G/DL (ref 3.5–5)
ALP SERPL-CCNC: 72 U/L (ref 34–104)
ALT SERPL W P-5'-P-CCNC: 33 U/L (ref 7–52)
ANION GAP SERPL CALCULATED.3IONS-SCNC: 5 MMOL/L (ref 4–13)
AST SERPL W P-5'-P-CCNC: 29 U/L (ref 13–39)
BASOPHILS # BLD AUTO: 0.06 THOUSANDS/ΜL (ref 0–0.1)
BASOPHILS NFR BLD AUTO: 1 % (ref 0–1)
BILIRUB SERPL-MCNC: 0.45 MG/DL (ref 0.2–1)
BUN SERPL-MCNC: 15 MG/DL (ref 5–25)
CALCIUM SERPL-MCNC: 9.3 MG/DL (ref 8.4–10.2)
CHLORIDE SERPL-SCNC: 103 MMOL/L (ref 96–108)
CO2 SERPL-SCNC: 30 MMOL/L (ref 21–32)
CREAT SERPL-MCNC: 1.08 MG/DL (ref 0.6–1.3)
EOSINOPHIL # BLD AUTO: 0.22 THOUSAND/ΜL (ref 0–0.61)
EOSINOPHIL NFR BLD AUTO: 4 % (ref 0–6)
ERYTHROCYTE [DISTWIDTH] IN BLOOD BY AUTOMATED COUNT: 11.9 % (ref 11.6–15.1)
GFR SERPL CREATININE-BSD FRML MDRD: 79 ML/MIN/1.73SQ M
GLUCOSE SERPL-MCNC: 129 MG/DL (ref 65–140)
HCT VFR BLD AUTO: 44.3 % (ref 36.5–49.3)
HGB BLD-MCNC: 15.1 G/DL (ref 12–17)
IMM GRANULOCYTES # BLD AUTO: 0.02 THOUSAND/UL (ref 0–0.2)
IMM GRANULOCYTES NFR BLD AUTO: 0 % (ref 0–2)
LYMPHOCYTES # BLD AUTO: 1.53 THOUSANDS/ΜL (ref 0.6–4.47)
LYMPHOCYTES NFR BLD AUTO: 28 % (ref 14–44)
MCH RBC QN AUTO: 31.2 PG (ref 26.8–34.3)
MCHC RBC AUTO-ENTMCNC: 34.1 G/DL (ref 31.4–37.4)
MCV RBC AUTO: 92 FL (ref 82–98)
MONOCYTES # BLD AUTO: 0.42 THOUSAND/ΜL (ref 0.17–1.22)
MONOCYTES NFR BLD AUTO: 8 % (ref 4–12)
NEUTROPHILS # BLD AUTO: 3.31 THOUSANDS/ΜL (ref 1.85–7.62)
NEUTS SEG NFR BLD AUTO: 59 % (ref 43–75)
NRBC BLD AUTO-RTO: 0 /100 WBCS
PLATELET # BLD AUTO: 302 THOUSANDS/UL (ref 149–390)
PMV BLD AUTO: 10.6 FL (ref 8.9–12.7)
POTASSIUM SERPL-SCNC: 4.2 MMOL/L (ref 3.5–5.3)
PROT SERPL-MCNC: 7.4 G/DL (ref 6.4–8.4)
RBC # BLD AUTO: 4.84 MILLION/UL (ref 3.88–5.62)
SODIUM SERPL-SCNC: 138 MMOL/L (ref 135–147)
WBC # BLD AUTO: 5.56 THOUSAND/UL (ref 4.31–10.16)

## 2024-09-17 PROCEDURE — 85025 COMPLETE CBC W/AUTO DIFF WBC: CPT

## 2024-09-17 PROCEDURE — 80053 COMPREHEN METABOLIC PANEL: CPT

## 2024-09-17 PROCEDURE — 36415 COLL VENOUS BLD VENIPUNCTURE: CPT

## 2024-09-18 ENCOUNTER — OFFICE VISIT (OUTPATIENT)
Dept: INTERNAL MEDICINE CLINIC | Age: 54
End: 2024-09-18
Payer: COMMERCIAL

## 2024-09-18 VITALS
WEIGHT: 261 LBS | DIASTOLIC BLOOD PRESSURE: 86 MMHG | SYSTOLIC BLOOD PRESSURE: 124 MMHG | OXYGEN SATURATION: 98 % | BODY MASS INDEX: 34.59 KG/M2 | TEMPERATURE: 98.1 F | HEIGHT: 73 IN | HEART RATE: 98 BPM

## 2024-09-18 DIAGNOSIS — E78.1 HYPERTRIGLYCERIDEMIA: ICD-10-CM

## 2024-09-18 DIAGNOSIS — E78.2 MIXED HYPERLIPIDEMIA: Primary | ICD-10-CM

## 2024-09-18 DIAGNOSIS — I10 ESSENTIAL HYPERTENSION: ICD-10-CM

## 2024-09-18 PROCEDURE — 99213 OFFICE O/P EST LOW 20 MIN: CPT | Performed by: PHYSICIAN ASSISTANT

## 2024-09-18 RX ORDER — LOSARTAN POTASSIUM 100 MG/1
100 TABLET ORAL DAILY
Qty: 90 TABLET | Refills: 1 | Status: SHIPPED | OUTPATIENT
Start: 2024-09-18

## 2024-09-18 NOTE — PROGRESS NOTES
Assessment/Plan:         Diagnoses and all orders for this visit:    Mixed hyperlipidemia  Comments:  labs ordered for Nov prior to f/u  low chol diet  Orders:  -     Comprehensive metabolic panel; Future  -     Lipid panel; Future    Essential hypertension  Comments:  continue losartan 100mg daily   BP check at home -goal 130/80 and below  improved  Orders:  -     losartan (COZAAR) 100 MG tablet; Take 1 tablet (100 mg total) by mouth daily  -     Comprehensive metabolic panel; Future  -     Lipid panel; Future    Hypertriglyceridemia  -     Comprehensive metabolic panel; Future  -     Lipid panel; Future          Subjective:      Patient ID: Earnest Andre is a 53 y.o. male.    52 y/o male w/ hx of htn, mixed hyperlipidemia presents for f/u  Tolerating losartan - improved BP since increasing dose   Requests refill    Labs reviewed, CMP and CBC   Will repeat chol profile in Nov prior to f/u visit to ensure improvement with fenofibrate        The following portions of the patient's history were reviewed and updated as appropriate: allergies, current medications, past family history, past medical history, past social history, past surgical history, and problem list.    Review of Systems   Constitutional:  Negative for activity change, appetite change, chills, fatigue and fever.   HENT:  Negative for congestion and sore throat.    Eyes:  Negative for pain and redness.   Respiratory:  Negative for cough and shortness of breath.    Cardiovascular:  Negative for chest pain and leg swelling.   Gastrointestinal:  Negative for abdominal pain, constipation and diarrhea.   Genitourinary:  Negative for frequency and hematuria.   Musculoskeletal:  Positive for arthralgias and back pain.   Skin:  Negative for rash.   Neurological:  Negative for dizziness, light-headedness, numbness and headaches.   Hematological:  Does not bruise/bleed easily.   Psychiatric/Behavioral:  Negative for sleep disturbance. The patient is not  "nervous/anxious.          Past Medical History:   Diagnosis Date    Hypertension          Current Outpatient Medications:     ALPRAZolam (XANAX) 1 mg tablet, Take 1.5 tablets (1.5 mg total) by mouth daily as needed for anxiety, Disp: 45 tablet, Rfl: 0    diclofenac sodium (VOLTAREN) 50 mg EC tablet, Take 1 tablet (50 mg total) by mouth 2 (two) times a day, Disp: 60 tablet, Rfl: 1    fenofibrate (TRICOR) 145 mg tablet, Take 1 tablet (145 mg total) by mouth daily, Disp: 90 tablet, Rfl: 1    losartan (COZAAR) 100 MG tablet, Take 1 tablet (100 mg total) by mouth daily, Disp: 90 tablet, Rfl: 1    methocarbamol (ROBAXIN) 500 mg tablet, Take 1 tablet (500 mg total) by mouth 3 (three) times a day as needed for muscle spasms, Disp: 90 tablet, Rfl: 1    EPINEPHrine (EpiPen 2-Tim) 0.3 mg/0.3 mL SOAJ, Inject 0.3 mL (0.3 mg total) into a muscle once for 1 dose, Disp: 0.6 mL, Rfl: 0    Allergies   Allergen Reactions    Honey Bee Venom Swelling    Other Cough     Seasonal- cough, sneezing, congestion       Social History   Past Surgical History:   Procedure Laterality Date    HERNIA REPAIR       Family History   Problem Relation Age of Onset    COPD Mother     Hypertension Father     Diabetes Father     Lung cancer Father     COPD Father     Vision loss Father        Objective:  /86   Pulse 98   Temp 98.1 °F (36.7 °C) (Temporal)   Ht 6' 1\" (1.854 m)   Wt 118 kg (261 lb)   SpO2 98%   BMI 34.43 kg/m²        Physical Exam  Vitals reviewed.   Constitutional:       General: He is not in acute distress.  HENT:      Head: Normocephalic and atraumatic.      Nose: Nose normal.      Mouth/Throat:      Mouth: Mucous membranes are moist.   Eyes:      General:         Right eye: No discharge.         Left eye: No discharge.      Extraocular Movements: Extraocular movements intact.      Conjunctiva/sclera: Conjunctivae normal.      Pupils: Pupils are equal, round, and reactive to light.   Cardiovascular:      Rate and Rhythm: Normal " rate and regular rhythm.   Pulmonary:      Effort: Pulmonary effort is normal. No respiratory distress.      Breath sounds: Normal breath sounds. No wheezing.   Musculoskeletal:      Right lower leg: No edema.      Left lower leg: No edema.   Neurological:      Mental Status: He is alert.   Psychiatric:         Mood and Affect: Mood normal.

## 2024-09-24 ENCOUNTER — OFFICE VISIT (OUTPATIENT)
Dept: PHYSICAL THERAPY | Facility: REHABILITATION | Age: 54
End: 2024-09-24
Payer: COMMERCIAL

## 2024-09-24 DIAGNOSIS — M43.16 SPONDYLOLISTHESIS, LUMBAR REGION: ICD-10-CM

## 2024-09-24 DIAGNOSIS — M54.16 LUMBAR RADICULOPATHY: ICD-10-CM

## 2024-09-24 DIAGNOSIS — M47.816 LUMBAR SPONDYLOSIS: Primary | ICD-10-CM

## 2024-09-24 PROCEDURE — 97140 MANUAL THERAPY 1/> REGIONS: CPT

## 2024-09-24 PROCEDURE — 97110 THERAPEUTIC EXERCISES: CPT

## 2024-09-24 NOTE — PROGRESS NOTES
"Daily Note     Today's date: 2024  Patient name: Earnest Andre  : 1970  MRN: [id number]  Referring provider: Katheryn Sprague PA-C  Dx:   Encounter Diagnosis     ICD-10-CM    1. Lumbar spondylosis  M47.816       2. Spondylolisthesis, lumbar region  M43.16       3. Lumbar radiculopathy  M54.16                      Subjective: pt presents to clinic reporting insidious onset of numbness in R foot and a \"knot\" in R LB since last Monday. He denied pain in anterior hip/thigh which he normally has in the past. He noted having a head cold last week, but continued with HEP and denied change in activity.      Objective: See treatment diary below      Assessment: Pt tolerated treatment well. Hypertrophy present in R lumbar psm's with mild-mod ttp. Assessed hip ROM with initial relief, but c/o increased numbness in R foot. Modified session this visit 2* subjective comments. Trialed extension exercises in which pt exhibits extension bias this visit, therefore, trialed LINH's to centralize numbness in R foot with positive response. Added manual lumbar traction with reduction in R LBP and numbness in R foot. Will monitor symptoms at [place]. Denied increased pain with current HEP. Pt instructed to perform LINH's with current HEP 1-2x/day. Patient demonstrated fatigue post treatment, exhibited good technique with therapeutic exercises, and would benefit from continued PT.      Plan: Continue per plan of care.  Progress treatment as tolerated.       Precautions:   Patient Active Problem List   Diagnosis    Acute pain of left knee    Continuous opioid dependence (HCC)    Essential hypertension    Hyperlipidemia    Lumbar radiculopathy    Spondylolisthesis, lumbar region    Lumbar spondylosis      Allergies   Allergen Reactions    Honey Bee Venom Swelling    Other Cough     Seasonal- cough, sneezing, congestion        Daily Treatment Diary    Date 8/29 9/10 9/24          FOTO nv            Re-Eval IE            Auth visit # 1 2 " "3          Manuals    Lat hip mobs - R  APRIL gr 4 np          RLE LAD  APRIL TE          Manual L/S traction   TE                       Neuro Re-Ed                                                                                                Ther Ex    Standing hip abd, ext  5\"x10 ea B np                       Hip flexor stretch EOT  20\"x5 np          LTR to L  20\"x5 np                       Bridges c PPT  5\"x10 np          Clamshells c TB  5\"x10 np          Quadruped kick backs  5\"x10 np          LINH's   1'x3          PPU's   X3 p!          Ther Activity    Treadmill   5' 1.8 mph           bike   L1x5'          Gait Training                              Modalities                                  Access Code: [email or web address]  URL: [email or web address]  Date: 08/29/2024  Prepared by: Alexandr Spaulding    Exercises  - Modified Carlos Stretch  - 2 x daily - 5 reps - 20 hold  - Half Kneeling Hip Flexor Stretch with Sidebend  - 2 x daily - 5 reps - 20 hold  - Sidelying Quadratus Lumborum Stretch on Table  - 2 x daily - 5 reps - 20 hold         "

## 2024-10-04 NOTE — PROGRESS NOTES
Assessment:  1. Lumbar radiculopathy    2. Spondylolisthesis, lumbar region    3. Lumbar spondylosis        Plan:  I will initiate gabapentin 300 mg nightly and titrate to 300 mg 3 times daily for neuropathic complaints.  Patient was educated on medications most common side effects which include dizziness, drowsiness, and swelling of the hands and feet.  Patient was instructed to call the office with any adverse medication side effects. The patient is also aware that if they would like to come off of the medication, they need to let us know as suddenly stopping the medication puts them at risk to have a seizure.  The patient was agreeable and verbalized an understanding.  Patient will continue physical therapy.  If no relief after 3 more weeks of conservative treatment, will order MRI lumbar spine without contrast  May continue diclofenac as prescribed.  He does not require refills today  Follow-up in 6 weeks or sooner if needed    History of Present Illness:    The patient is a 54 y.o. male last seen on 08/21/2024  who presents for a follow up office visit in regards to chronic lumbosacral back pain that will radiate into the lateral aspect of the right lower extremity to the foot with associated numbness and paresthesias.  He denies left-sided symptoms, bowel or bladder incontinence or saddle anesthesia.  Patient has completed 3 sessions of physical therapy which he feels is only worsened his symptoms.  He is taking diclofenac 50 mg twice daily as needed with mild relief.  He has not tried membrane stabilizers.  He did have bending and extending x-rays of the lumbar spine which did reveal bilateral L5 pars defects and grade 1 anterolisthesis without instability on flexion or extension.    The patient rates his pain a 9 out of 10 on the numeric pain rating scale.  Pain is constant throughout the day and is described as shooting, numbness and pins-and-needles    I have personally reviewed and/or updated the  patient's past medical history, past surgical history, family history, social history, current medications, allergies, and vital signs today.       Review of Systems:    Review of Systems   Respiratory:  Negative for shortness of breath.    Cardiovascular:  Negative for chest pain.   Gastrointestinal:  Negative for constipation, diarrhea, nausea and vomiting.   Musculoskeletal:  Positive for back pain and gait problem. Negative for arthralgias, joint swelling and myalgias.   Skin:  Negative for rash.   Neurological:  Negative for dizziness, seizures and weakness.   All other systems reviewed and are negative.        Past Medical History:   Diagnosis Date    Hypertension        Past Surgical History:   Procedure Laterality Date    HERNIA REPAIR         Family History   Problem Relation Age of Onset    COPD Mother     Hypertension Father     Diabetes Father     Lung cancer Father     COPD Father     Vision loss Father        Social History     Occupational History    Not on file   Tobacco Use    Smoking status: Never     Passive exposure: Past    Smokeless tobacco: Former     Types: Chew     Quit date: 10/25/2023    Tobacco comments:     Stopped chewing  tobacco 10/23   Vaping Use    Vaping status: Never Used   Substance and Sexual Activity    Alcohol use: Yes     Comment: social    Drug use: Never    Sexual activity: Not on file         Current Outpatient Medications:     gabapentin (NEURONTIN) 300 mg capsule, Take 1 PO HS x 3 days, then 1 PO BID x 3 days, then 1 PO TID, Disp: 90 capsule, Rfl: 1    ALPRAZolam (XANAX) 1 mg tablet, Take 1.5 tablets (1.5 mg total) by mouth daily as needed for anxiety, Disp: 45 tablet, Rfl: 0    diclofenac sodium (VOLTAREN) 50 mg EC tablet, Take 1 tablet (50 mg total) by mouth 2 (two) times a day, Disp: 60 tablet, Rfl: 1    EPINEPHrine (EpiPen 2-Tim) 0.3 mg/0.3 mL SOAJ, Inject 0.3 mL (0.3 mg total) into a muscle once for 1 dose, Disp: 0.6 mL, Rfl: 0    fenofibrate (TRICOR) 145 mg tablet,  "Take 1 tablet (145 mg total) by mouth daily, Disp: 90 tablet, Rfl: 1    losartan (COZAAR) 100 MG tablet, Take 1 tablet (100 mg total) by mouth daily, Disp: 90 tablet, Rfl: 1    methocarbamol (ROBAXIN) 500 mg tablet, Take 1 tablet (500 mg total) by mouth 3 (three) times a day as needed for muscle spasms, Disp: 90 tablet, Rfl: 1    Allergies   Allergen Reactions    Honey Bee Venom Swelling    Other Cough     Seasonal- cough, sneezing, congestion       Physical Exam:    /87   Pulse 98   Ht 6' 1\" (1.854 m)   Wt 118 kg (261 lb)   BMI 34.43 kg/m²     Constitutional:normal, well developed, well nourished, alert, in no distress and non-toxic and no overt pain behavior.  Eyes:anicteric  HEENT:grossly intact  Neck:supple, symmetric, trachea midline and no masses   Pulmonary:even and unlabored  Cardiovascular:No edema or pitting edema present  Skin:Normal without rashes or lesions and well hydrated  Psychiatric:Mood and affect appropriate  Neurologic:Cranial Nerves II-XII grossly intact  Musculoskeletal:normal gait.  Positive seated straight leg raise on the right      Imaging  No orders to display       Narrative & Impression  LUMBAR SPINE     INDICATION:   M54.50: Low back pain, unspecified.     COMPARISON:  None     VIEWS:  XR SPINE LUMBAR MINIMUM 4 VIEWS NON INJURY  Images: 5     FINDINGS:     There are 5 non rib bearing lumbar vertebral bodies.     There is no evidence of acute fracture or destructive osseous lesion.     Grade 1 anterolisthesis L5-S1. Moderate degenerative spondylosis L5-S1  Probable bilateral L5 spondylolysis     The pedicles appear intact.     Soft tissues are unremarkable.     IMPRESSION:     No acute lumbar spinal abnormalities identified        Workstation performed: [id number]       No orders of the defined types were placed in this encounter.      "

## 2024-10-07 ENCOUNTER — OFFICE VISIT (OUTPATIENT)
Dept: PAIN MEDICINE | Facility: CLINIC | Age: 54
End: 2024-10-07
Payer: COMMERCIAL

## 2024-10-07 VITALS
HEART RATE: 98 BPM | HEIGHT: 73 IN | WEIGHT: 261 LBS | DIASTOLIC BLOOD PRESSURE: 87 MMHG | BODY MASS INDEX: 34.59 KG/M2 | SYSTOLIC BLOOD PRESSURE: 138 MMHG

## 2024-10-07 DIAGNOSIS — M47.816 LUMBAR SPONDYLOSIS: ICD-10-CM

## 2024-10-07 DIAGNOSIS — M54.16 LUMBAR RADICULOPATHY: Primary | ICD-10-CM

## 2024-10-07 DIAGNOSIS — M43.16 SPONDYLOLISTHESIS, LUMBAR REGION: ICD-10-CM

## 2024-10-07 PROCEDURE — 99214 OFFICE O/P EST MOD 30 MIN: CPT | Performed by: NURSE PRACTITIONER

## 2024-10-07 RX ORDER — GABAPENTIN 300 MG/1
CAPSULE ORAL
Qty: 90 CAPSULE | Refills: 1 | Status: SHIPPED | OUTPATIENT
Start: 2024-10-07

## 2024-10-07 NOTE — LETTER
October 7, 2024     Patient: Earnest Andre  YOB: 1970  Date of Visit: 10/7/2024      To Whom it May Concern:    Earnest Andre is under my professional care. Earnest was seen in my office on 10/7/2024. Earnest may return to work on 10/08/2024 .    If you have any questions or concerns, please don't hesitate to call.         Sincerely,          EUGENIA Dawn        CC:   No Recipients

## 2024-10-07 NOTE — PATIENT INSTRUCTIONS
Patient Education     Gabapentin (GA ba pen tin)   Brand Names: US Gralise; Neurontin   Brand Names: Nicolle AG-Gabapentin; APO-Gabapentin; Auro-Gabapentin; BIO-Gabapentin [DSC]; DOM-Gabapentin; GD-Gabapentin [DSC]; GLN-Gabapentin; JAMP-Gabapentin; Mar-Gabapentin [DSC]; MINT-Gabapentin; Neurontin; PMS-Gabapentin; Priva-Gabapentin [DSC]; PRO-Gabapentin; RAN-Gabapentin [DSC]; JOSH-Gabapentin; TARO-Gabapentin [DSC]; TEVA-Gabapentin   What is this drug used for?   It is used to treat painful nerve diseases.  It is used to help control certain kinds of seizures.  It may be given to you for other reasons. Talk with the doctor.  What do I need to tell my doctor BEFORE I take this drug?   If you are allergic to this drug; any part of this drug; or any other drugs, foods, or substances. Tell your doctor about the allergy and what signs you had.  If you have kidney disease or are on dialysis.  This is not a list of all drugs or health problems that interact with this drug.  Tell your doctor and pharmacist about all of your drugs (prescription or OTC, natural products, vitamins) and health problems. You must check to make sure that it is safe for you to take this drug with all of your drugs and health problems. Do not start, stop, or change the dose of any drug without checking with your doctor.  What are some things I need to know or do while I take this drug?   For all uses of this drug:   Tell all of your health care providers that you take this drug. This includes your doctors, nurses, pharmacists, and dentists.  Avoid driving and doing other tasks or actions that call for you to be alert until you see how this drug affects you.  This drug may affect certain lab tests. Tell all of your health care providers and lab workers that you take this drug.  Have blood work checked as you have been told by the doctor. Talk with the doctor.  Talk with your doctor before you use alcohol, marijuana or other forms of cannabis, or  prescription or OTC drugs that may slow your actions.  This drug is not the same as gabapentin enacarbil (Horizant). Do not use in its place. Talk with the doctor.  Do not stop taking this drug all of a sudden without calling your doctor. You may have a greater risk of side effects. If you need to stop this drug, you will want to slowly stop it as ordered by your doctor.  A severe and sometimes deadly reaction has happened. Most of the time, this reaction has signs like fever, rash, or swollen glands with problems in body organs like the liver, kidney, blood, heart, muscles and joints, or lungs. If you have questions, talk with the doctor.  Severe breathing problems have happened with this drug in people taking certain other drugs (like opioid pain drugs). This has also happened in people who already have lung or breathing problems. The risk may also be greater in people who are older than 65. Sometimes, breathing problems have been deadly. If you have questions, talk with the doctor.  If you are 65 or older, use this drug with care. You could have more side effects.  If the patient is 3 to 12 years of age, use this drug with care. The risk of mood or behavior problems may be higher in these children.  Tell your doctor if you are pregnant, plan on getting pregnant, or are breast-feeding. You will need to talk about the benefits and risks to you and the baby.  For seizures:   If seizures are different or worse after starting this drug, talk with the doctor.  What are some side effects that I need to call my doctor about right away?   WARNING/CAUTION: Even though it may be rare, some people may have very bad and sometimes deadly side effects when taking a drug. Tell your doctor or get medical help right away if you have any of the following signs or symptoms that may be related to a very bad side effect:  Signs of an allergic reaction, like rash; hives; itching; red, swollen, blistered, or peeling skin with or without  fever; wheezing; tightness in the chest or throat; trouble breathing, swallowing, or talking; unusual hoarseness; or swelling of the mouth, face, lips, tongue, or throat.  Signs of liver problems like dark urine, tiredness, decreased appetite, upset stomach or stomach pain, light-colored stools, throwing up, or yellow skin or eyes.  Signs of kidney problems like unable to pass urine, change in how much urine is passed, blood in the urine, or a big weight gain.  Trouble controlling body movements, twitching, change in balance, trouble swallowing or speaking.  Memory problems or loss.  Change in eyesight.  Not able to control eye movements.  Feeling confused, not able to focus, or change in behavior.  Shakiness.  Trouble breathing, slow breathing, or shallow breathing.  Blue or gray color of the skin, lips, nail beds, fingers, or toes.  Swelling in the arms or legs.  Severe dizziness or passing out.  Fever, chills, or sore throat; any unexplained bruising or bleeding; or feeling very tired or weak.  Muscle pain or weakness.  Get medical help right away if you feel very sleepy, very dizzy, or if you pass out. Caregivers or others need to get medical help right away if the patient does not respond, does not answer or react like normal, or will not wake up.  Like other drugs that may be used for seizures, this drug may rarely raise the risk of suicidal thoughts or actions. The risk may be higher in people who have had suicidal thoughts or actions in the past. Call the doctor right away about any new or worse signs like depression; feeling nervous, restless, or grouchy; panic attacks; or other changes in mood or behavior. Call the doctor right away if any suicidal thoughts or actions occur.  What are some other side effects of this drug?   All drugs may cause side effects. However, many people have no side effects or only have minor side effects. Call your doctor or get medical help if any of these side effects or any  other side effects bother you or do not go away:  Feeling dizzy, sleepy, tired, or weak.  Diarrhea, upset stomach, or throwing up.  Dry mouth.  These are not all of the side effects that may occur. If you have questions about side effects, call your doctor. Call your doctor for medical advice about side effects.  You may report side effects to your national health agency.  You may report side effects to the FDA at 1-887.336.6670. You may also report side effects at https://www.fda.gov/medwatch.  How is this drug best taken?   Use this drug as ordered by your doctor. Read all information given to you. Follow all instructions closely.  All products:   Keep taking this drug as you have been told by your doctor or other health care provider, even if you feel well.  If you are taking an antacid that has aluminum or magnesium in it, take this drug at least 2 hours after taking the antacid.  Gralise:   Take with the evening meal.  Swallow whole. Do not chew, break, or crush.  All other products:   Take with or without food.  Capsules:   Swallow whole with a full glass of water.  Tablets:   You may break the tablet in half. Do not chew or crush.  If you break the tablet in half, use the other half of the tablet for the next dose, as told by the doctor. Throw away half-tablets not used within 28 days.  Liquid (solution):   Measure liquid doses carefully. Use the measuring device that comes with this drug. If there is none, ask the pharmacist for a device to measure this drug.  What do I do if I miss a dose?   Take a missed dose as soon as you think about it.  If it is close to the time for your next dose, skip the missed dose and go back to your normal time.  Do not take 2 doses at the same time or extra doses.  How do I store and/or throw out this drug?   Liquid (solution):   Store in a refrigerator. Do not freeze.  All other products:   Store at room temperature in a dry place. Do not store in a bathroom.  All dose forms:    Keep all drugs in a safe place. Keep all drugs out of the reach of children and pets.  Throw away unused or  drugs. Do not flush down a toilet or pour down a drain unless you are told to do so. Check with your pharmacist if you have questions about the best way to throw out drugs. There may be drug take-back programs in your area.  General drug facts   If your symptoms or health problems do not get better or if they become worse, call your doctor.  Do not share your drugs with others and do not take anyone else's drugs.  Some drugs may have another patient information leaflet. If you have any questions about this drug, please talk with your doctor, nurse, pharmacist, or other health care provider.  Some drugs may have another patient information leaflet. Check with your pharmacist. If you have any questions about this drug, please talk with your doctor, nurse, pharmacist, or other health care provider.  If you think there has been an overdose, call your poison control center or get medical care right away. Be ready to tell or show what was taken, how much, and when it happened.  Consumer Information Use and Disclaimer   This generalized information is a limited summary of diagnosis, treatment, and/or medication information. It is not meant to be comprehensive and should be used as a tool to help the user understand and/or assess potential diagnostic and treatment options. It does NOT include all information about conditions, treatments, medications, side effects, or risks that may apply to a specific patient. It is not intended to be medical advice or a substitute for the medical advice, diagnosis, or treatment of a health care provider based on the health care provider's examination and assessment of a patient's specific and unique circumstances. Patients must speak with a health care provider for complete information about their health, medical questions, and treatment options, including any risks or  benefits regarding use of medications. This information does not endorse any treatments or medications as safe, effective, or approved for treating a specific patient. Up[employer], Inc[employer] and its affiliates disclaim any warranty or liability relating to this information or the use thereof. The use of this information is governed by the Terms of Use, available at https://www.[employer].com/en/know/clinical-effectiveness-terms.  Last Reviewed Date   2023-05-09  Copyright   © 2024 UpToDate, Inc. and its affiliates and/or licensors. All rights reserved.

## 2024-10-08 ENCOUNTER — OFFICE VISIT (OUTPATIENT)
Dept: PHYSICAL THERAPY | Facility: REHABILITATION | Age: 54
End: 2024-10-08

## 2024-10-08 DIAGNOSIS — M47.816 LUMBAR SPONDYLOSIS: Primary | ICD-10-CM

## 2024-10-08 DIAGNOSIS — M43.16 SPONDYLOLISTHESIS, LUMBAR REGION: ICD-10-CM

## 2024-10-08 DIAGNOSIS — M54.16 LUMBAR RADICULOPATHY: ICD-10-CM

## 2024-10-08 NOTE — PROGRESS NOTES
Pt comes to therapy noting his symptoms seem to have progressed and worsened. Pt notes he now no longer experiences pain in his upper thigh, however, has a constant level of numbness and tingling in his right foot. States these paresthesias have been affecting his ability to drive, as he notes he does not feel safe driving due to inability to feel the pedal. As a result, his ability to complete ADLs, drive, and perform work duties have been greatly affected. 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     The patient notes he had a follow up with his pain management physician's office yesterday, noting it was suggested he have an injection performed. Due to worsening symptoms, more persistent/constant paresthesias that have developed in his foot, as well as safety concerns, physical therapy services are no recommended at this time, as further diagnostic imaging or interventions seem required. Patient in agreement with plan and therapist verbalizes contact will be made with referring physician and patient referred back for advisement.

## 2024-10-23 ENCOUNTER — TELEPHONE (OUTPATIENT)
Age: 54
End: 2024-10-23

## 2024-10-23 DIAGNOSIS — M54.16 LUMBAR RADICULOPATHY: Primary | ICD-10-CM

## 2024-10-23 NOTE — TELEPHONE ENCOUNTER
Yes or a discharge from PT which per their note, appears they have discharged him with failure to improve. MRI order placed in chart.

## 2024-10-23 NOTE — TELEPHONE ENCOUNTER
Caller: Vanessa( pt significant other)     Doctor: Patti    Reason for call: requesting an order for a MRI, to be placed in pts chart. Please Advise     Call back#: [phone]

## 2024-10-23 NOTE — TELEPHONE ENCOUNTER
Attempted to call Vanessa as per KATHRYN and left a detailed mom in regards to the MRI referral, central scheduling phone number provided and reviewed Auth process, encouraged her to CB if any questions.

## 2024-11-06 ENCOUNTER — HOSPITAL ENCOUNTER (OUTPATIENT)
Dept: MRI IMAGING | Facility: HOSPITAL | Age: 54
Discharge: HOME | End: 2024-11-06
Payer: COMMERCIAL

## 2024-11-06 DIAGNOSIS — M54.16 LUMBAR RADICULOPATHY: ICD-10-CM

## 2024-11-06 PROCEDURE — 72148 MRI LUMBAR SPINE W/O DYE: CPT

## 2024-11-07 ENCOUNTER — TELEPHONE (OUTPATIENT)
Dept: PAIN MEDICINE | Facility: CLINIC | Age: 54
End: 2024-11-07

## 2024-11-07 DIAGNOSIS — M54.16 LUMBAR RADICULOPATHY: Primary | ICD-10-CM

## 2024-11-07 NOTE — TELEPHONE ENCOUNTER
Called patient back scheduled procedure  Reviewed all instructions by phone upon scheduling  Mailed copy to home

## 2024-11-07 NOTE — TELEPHONE ENCOUNTER
S/w pt and advised of same. Pt verbalized understanding and appreciation.     Pt would like to move forward with injection.  Pt denies anticoags or diabetes.    Pt advised he would be contacted to schedule

## 2024-11-07 NOTE — TELEPHONE ENCOUNTER
MRI of the lumbar spine demonstrates degenerative disc disease from L2-3 through L5-S1 with grade 1 spondylolisthesis secondary to bilateral L5 pars defects and a moderate disc bulge causing moderate to severe bilateral foraminal stenosis with encroachment of bilateral exiting L5 nerve roots at L5-S1.  Can offer the patient a right L5 TFESI for patient's low back and RLE symptoms

## 2024-11-13 NOTE — PROGRESS NOTES
Assessment:  1. Lumbar radiculopathy    2. Spondylolisthesis, lumbar region        Plan:  Patient currently scheduled for a right L5 TFESI November 27, 2024.  He will keep this appointment  Patient currently taking gabapentin on a as needed basis.  I did discuss for effectiveness patient should take the medication consistently.  He may see how he does with the injection prior to doing so.  He does not require refills today  Patient will follow-up after procedure or sooner if needed    History of Present Illness:    The patient is a 54 y.o. male last seen on 10/07/2024  who presents for a follow up office visit in regards to chronic lumbosacral back pain that radiates into the lateral aspect of the right lower extremity to the foot with associated numbness and paresthesias.  He denies left-sided symptoms, bowel or bladder incontinence or saddle anesthesia.  MRI of the lumbar spine demonstrates degenerative disc disease from L2-3 through L5-S1 with grade 1 spondylolisthesis secondary to bilateral L5 pars defects and a moderate disc bulge causing moderate to severe bilateral foraminal stenosis with encroachment of bilateral exiting L5 nerve roots.  He is scheduled for a right L5 TFESI on November 27, 2024.  The patient states he has been taking gabapentin on a as needed basis which has been somewhat helpful in taking this way    The patient rates his pain an 8 out of 10 on the numeric pain rating scale.  Pain is constant throughout the day and is described as shooting, numbness and pins-and-needles    I have personally reviewed and/or updated the patient's past medical history, past surgical history, family history, social history, current medications, allergies, and vital signs today.       Review of Systems:    Review of Systems   Constitutional:  Negative for fever and unexpected weight change.   HENT:  Negative for trouble swallowing.    Eyes:  Negative for visual disturbance.   Respiratory:  Negative for shortness  of breath and wheezing.    Cardiovascular:  Negative for chest pain and palpitations.   Gastrointestinal:  Negative for constipation, diarrhea, nausea and vomiting.   Endocrine: Negative for cold intolerance, heat intolerance and polydipsia.   Genitourinary:  Negative for difficulty urinating and frequency.   Musculoskeletal:  Positive for back pain and gait problem. Negative for arthralgias, joint swelling and myalgias.   Skin:  Negative for rash.   Neurological:  Negative for dizziness, seizures, syncope, weakness and headaches.   Hematological:  Does not bruise/bleed easily.   Psychiatric/Behavioral:  Negative for dysphoric mood.    All other systems reviewed and are negative.        Past Medical History:   Diagnosis Date    Hypertension        Past Surgical History:   Procedure Laterality Date    HERNIA REPAIR         Family History   Problem Relation Age of Onset    COPD Mother     Hypertension Father     Diabetes Father     Lung cancer Father     COPD Father     Vision loss Father        Social History     Occupational History    Not on file   Tobacco Use    Smoking status: Never     Passive exposure: Past    Smokeless tobacco: Former     Types: Chew     Quit date: 10/25/2023    Tobacco comments:     Stopped chewing  tobacco 10/23   Vaping Use    Vaping status: Never Used   Substance and Sexual Activity    Alcohol use: Yes     Comment: social    Drug use: Never    Sexual activity: Not on file         Current Outpatient Medications:     ALPRAZolam (XANAX) 1 mg tablet, Take 1.5 tablets (1.5 mg total) by mouth daily as needed for anxiety, Disp: 45 tablet, Rfl: 0    diclofenac sodium (VOLTAREN) 50 mg EC tablet, Take 1 tablet (50 mg total) by mouth 2 (two) times a day, Disp: 60 tablet, Rfl: 1    fenofibrate (TRICOR) 145 mg tablet, Take 1 tablet (145 mg total) by mouth daily, Disp: 90 tablet, Rfl: 1    gabapentin (NEURONTIN) 300 mg capsule, Take 1 PO HS x 3 days, then 1 PO BID x 3 days, then 1 PO TID, Disp: 90  capsule, Rfl: 1    losartan (COZAAR) 100 MG tablet, Take 1 tablet (100 mg total) by mouth daily, Disp: 90 tablet, Rfl: 1    methocarbamol (ROBAXIN) 500 mg tablet, Take 1 tablet (500 mg total) by mouth 3 (three) times a day as needed for muscle spasms, Disp: 90 tablet, Rfl: 1    EPINEPHrine (EpiPen 2-Tim) 0.3 mg/0.3 mL SOAJ, Inject 0.3 mL (0.3 mg total) into a muscle once for 1 dose, Disp: 0.6 mL, Rfl: 0    Allergies   Allergen Reactions    Honey Bee Venom Swelling    Other Cough     Seasonal- cough, sneezing, congestion       Physical Exam:    BP (!) 158/110   Pulse 71     Constitutional:normal, well developed, well nourished, alert, in no distress and non-toxic and no overt pain behavior.  Eyes:anicteric  HEENT:grossly intact  Neck:supple, symmetric, trachea midline and no masses   Pulmonary:even and unlabored  Cardiovascular:No edema or pitting edema present  Skin:Normal without rashes or lesions and well hydrated  Psychiatric:Mood and affect appropriate  Neurologic:Cranial Nerves II-XII grossly intact  Musculoskeletal:normal gait      Imaging  No orders to display       MRI LUMBAR SPINE WITHOUT CONTRAST     INDICATION: M54.16: Radiculopathy, lumbar region.   History of bilateral L5 pars defects and grade 1 anterolisthesis without instability on flexion or extension.     COMPARISON: Lumbar spine radiograph 8/21/2024.     TECHNIQUE:  Multiplanar, multisequence imaging of the lumbar spine was performed. .  Imaging performed on 1.5T MRI     IMAGE QUALITY:  Diagnostic     FINDINGS:     VERTEBRAL BODIES:  There are 5 lumbar type vertebral bodies.     Normal alignment of the lumbar spine.     Grade 1 anterolisthesis of L5 on S1 with bilateral pars interarticularis defects at L5.     Type II Modic endplate changes at L5-S1.     No scoliosis. No compression fracture.  No discrete marrow lesion.     SACRUM: Type II Modic changes can be seen within the sacral aspects of the bilateral sacroiliac joints. No evidence of  insufficiency or stress fracture.     DISTAL CORD AND CONUS:  Normal size and signal within the distal cord and conus. The conus terminates at the L1 level.  The cauda equina nerve roots appear within normal limits.     PARASPINAL SOFT TISSUES:  Paraspinal soft tissues are unremarkable.     LOWER THORACIC DISC SPACES:  Normal disc height and signal.  No disc herniation, canal stenosis or foraminal narrowing. There is ventral osteophytosis of the visualized thoracic vertebra.     LUMBAR DISC SPACES: Mild disc degeneration and height loss at L4-5. Moderate degeneration and disc height loss at L5-S1.     L1-2: No focal disc herniation.  No central canal or  subarticular/lateral recess narrowing.  No neural foraminal stenosis.     L2-3: Mild diffuse disc bulge. No focal disc herniation.  No central canal or  subarticular/lateral recess narrowing.  No neural foraminal stenosis.     L3-4: Mild diffuse disc bulge. No focal disc herniation.  No central canal or  subarticular/lateral recess narrowing.  No neural foraminal stenosis.     L4-5: Mild diffuse disc bulge with a focal left paracentral protrusion.  No central canal stenosis. Mild left subarticular/lateral recess narrowing.  No neural foraminal stenosis.     L5-S1: Grade 1 spondylolisthesis with moderate diffuse disc bulge extending into the neural foraminal regions causing moderate to severe bilateral neural foraminal stenosis with encroachment of the exiting L5 nerve roots. There is no significant central   canal stenosis. There is narrowing of the thecal sac secondary to prominence of the epidural fat.     OTHER FINDINGS:  None.     IMPRESSION:     Multilevel lumbar degenerative disc disease as detailed.     Bilateral L5 spondylolysis with grade 1 L5 on S1 spondylolisthesis. There is secondary disc bulge extending into the foraminal regions causing moderate to severe bilateral neural foraminal narrowing and encroachment of the exiting L5 nerve roots.   Correlate  with radiculopathy symptoms.  No orders of the defined types were placed in this encounter.

## 2024-11-18 ENCOUNTER — OFFICE VISIT (OUTPATIENT)
Dept: PAIN MEDICINE | Facility: CLINIC | Age: 54
End: 2024-11-18
Payer: COMMERCIAL

## 2024-11-18 VITALS — HEART RATE: 71 BPM | DIASTOLIC BLOOD PRESSURE: 110 MMHG | SYSTOLIC BLOOD PRESSURE: 158 MMHG

## 2024-11-18 DIAGNOSIS — M43.16 SPONDYLOLISTHESIS, LUMBAR REGION: ICD-10-CM

## 2024-11-18 DIAGNOSIS — M54.16 LUMBAR RADICULOPATHY: Primary | ICD-10-CM

## 2024-11-18 PROCEDURE — 99213 OFFICE O/P EST LOW 20 MIN: CPT | Performed by: NURSE PRACTITIONER

## 2024-11-22 ENCOUNTER — IMMUNIZATIONS (OUTPATIENT)
Age: 54
End: 2024-11-22
Payer: COMMERCIAL

## 2024-11-22 DIAGNOSIS — Z23 ENCOUNTER FOR IMMUNIZATION: Primary | ICD-10-CM

## 2024-11-22 PROCEDURE — 90471 IMMUNIZATION ADMIN: CPT

## 2024-11-22 PROCEDURE — 90673 RIV3 VACCINE NO PRESERV IM: CPT

## 2024-11-27 ENCOUNTER — HOSPITAL ENCOUNTER (OUTPATIENT)
Dept: RADIOLOGY | Facility: CLINIC | Age: 54
Discharge: HOME | End: 2024-11-27
Payer: COMMERCIAL

## 2024-11-27 VITALS
HEART RATE: 82 BPM | SYSTOLIC BLOOD PRESSURE: 156 MMHG | OXYGEN SATURATION: 98 % | DIASTOLIC BLOOD PRESSURE: 92 MMHG | RESPIRATION RATE: 20 BRPM | TEMPERATURE: 97.9 F

## 2024-11-27 DIAGNOSIS — M54.16 LUMBAR RADICULOPATHY: ICD-10-CM

## 2024-11-27 PROCEDURE — 64483 NJX AA&/STRD TFRM EPI L/S 1: CPT | Performed by: ANESTHESIOLOGY

## 2024-11-27 RX ORDER — PAPAVERINE HCL 150 MG
10 CAPSULE, EXTENDED RELEASE ORAL ONCE
Status: COMPLETED | OUTPATIENT
Start: 2024-11-27 | End: 2024-11-27

## 2024-11-27 RX ADMIN — DEXAMETHASONE SODIUM PHOSPHATE 10 MG: 10 INJECTION, SOLUTION INTRAMUSCULAR; INTRAVENOUS at 09:13

## 2024-11-27 RX ADMIN — IOHEXOL 1 ML: 300 INJECTION, SOLUTION INTRAVENOUS at 09:10

## 2024-11-27 RX ADMIN — LIDOCAINE HYDROCHLORIDE 1 ML: 20 INJECTION, SOLUTION EPIDURAL; INFILTRATION; INTRACAUDAL; PERINEURAL at 09:13

## 2024-11-27 NOTE — H&P
History of Present Illness: The patient is a 54 y.o. male who presents with complaints of low back and leg pain.    Past Medical History:   Diagnosis Date    Hypertension        Past Surgical History:   Procedure Laterality Date    HERNIA REPAIR           Current Outpatient Medications:     ALPRAZolam (XANAX) 1 mg tablet, Take 1.5 tablets (1.5 mg total) by mouth daily as needed for anxiety, Disp: 45 tablet, Rfl: 0    diclofenac sodium (VOLTAREN) 50 mg EC tablet, Take 1 tablet (50 mg total) by mouth 2 (two) times a day, Disp: 60 tablet, Rfl: 1    EPINEPHrine (EpiPen 2-Tim) 0.3 mg/0.3 mL SOAJ, Inject 0.3 mL (0.3 mg total) into a muscle once for 1 dose, Disp: 0.6 mL, Rfl: 0    fenofibrate (TRICOR) 145 mg tablet, Take 1 tablet (145 mg total) by mouth daily, Disp: 90 tablet, Rfl: 1    gabapentin (NEURONTIN) 300 mg capsule, Take 1 PO HS x 3 days, then 1 PO BID x 3 days, then 1 PO TID, Disp: 90 capsule, Rfl: 1    losartan (COZAAR) 100 MG tablet, Take 1 tablet (100 mg total) by mouth daily, Disp: 90 tablet, Rfl: 1    methocarbamol (ROBAXIN) 500 mg tablet, Take 1 tablet (500 mg total) by mouth 3 (three) times a day as needed for muscle spasms, Disp: 90 tablet, Rfl: 1    Allergies   Allergen Reactions    Honey Bee Venom Swelling    Other Cough     Seasonal- cough, sneezing, congestion       Physical Exam:   Vitals:    11/27/24 0854   BP: 154/89   Pulse: 97   Resp: 20   Temp: 97.9 °F (36.6 °C)   SpO2: 99%     General: Awake, Alert, Oriented x 3, Mood and affect appropriate  Respiratory: Respirations even and unlabored  Cardiovascular: Peripheral pulses intact; no edema  Musculoskeletal Exam: antalgic gait    ASA Score: 2    Patient/Chart Verification  Patient ID Verified: Verbal  ID Band Applied: No  Consents Confirmed: Procedural  H&P( within 30 days) Verified: To be obtained in the Procedural area  Interval H&P(within 24 hr) Complete (required for Outpatients and Surgery Admit only): To be obtained in the Procedural  area  Allergies Reviewed: Yes  Anticoag/NSAID held?: No  Currently on antibiotics?: No  Pre-op Lab/Test Results Available: In chart    Assessment:   1. Lumbar radiculopathy        Plan: RIGHT L5 TFESI

## 2024-11-27 NOTE — DISCHARGE INSTRUCTIONS
Epidural Steroid Injection   WHAT YOU NEED TO KNOW:   An epidural steroid injection (CARLIE) is a procedure to inject steroid medicine into the epidural space. The epidural space is between your spinal cord and vertebrae. Steroids reduce inflammation and fluid buildup in your spine that may be causing pain. You may be given pain medicine along with the steroids.          ACTIVITY  Do not drive or operate machinery today.  No strenuous activity today - bending, lifting, etc.  You may resume normal activites starting tomorrow - start slowly and as tolerated.  You may shower today, but no tub baths or hot tubs.  You may have numbness for several hours from the local anesthetic. Please use caution and common sense, especially with weight-bearing activities.    CARE OF THE INJECTION SITE  If you have soreness or pain, apply ice to the area today (20 minutes on/20 minutes off).  Starting tomorrow, you may use warm, moist heat or ice if needed.  You may have an increase or change in your discomfort for 36-48 hours after your treatment.  Apply ice and continue with any pain medication you have been prescribed.  Notify the [facility] if you have any of the following: redness, drainage, swelling, headache, stiff neck or fever above 100°F.    SPECIAL INSTRUCTIONS  Our office will contact you in approximately 14 days for a progress report.    MEDICATIONS  Continue to take all routine medications.  Our office may have instructed you to hold some medications.    As no general anesthesia was used in today's procedure, you should not experience any side effects related to anesthesia.     If you are diabetic, the steroids used in today's injection may temporarily increase your blood sugar levels after the first few days after your injection. Please keep a close eye on your sugars and alert the doctor who manages your diabetes if your sugars are significantly high from your baseline or you are symptomatic.     If you have a  problem specifically related to your procedure, please call our office at [phone].  Problems not related to your procedure should be directed to your primary care physician.

## 2024-12-11 ENCOUNTER — TELEPHONE (OUTPATIENT)
Dept: PAIN MEDICINE | Facility: CLINIC | Age: 54
End: 2024-12-11

## 2024-12-11 NOTE — TELEPHONE ENCOUNTER
Patient Reports   25     %     improvement post injection    Pain Level  0   /10   Has numbness at times  - 9/10

## 2025-01-06 ENCOUNTER — OFFICE VISIT (OUTPATIENT)
Dept: PAIN MEDICINE | Facility: CLINIC | Age: 55
End: 2025-01-06
Payer: COMMERCIAL

## 2025-01-06 VITALS — WEIGHT: 264 LBS | HEIGHT: 73 IN | BODY MASS INDEX: 34.99 KG/M2

## 2025-01-06 DIAGNOSIS — M43.06 LUMBAR SPONDYLOLYSIS: ICD-10-CM

## 2025-01-06 DIAGNOSIS — M54.16 LUMBAR RADICULOPATHY: ICD-10-CM

## 2025-01-06 DIAGNOSIS — E78.1 HYPERTRIGLYCERIDEMIA: ICD-10-CM

## 2025-01-06 DIAGNOSIS — M47.816 LUMBAR SPONDYLOSIS: ICD-10-CM

## 2025-01-06 DIAGNOSIS — M43.16 SPONDYLOLISTHESIS, LUMBAR REGION: Primary | ICD-10-CM

## 2025-01-06 PROCEDURE — 99214 OFFICE O/P EST MOD 30 MIN: CPT | Performed by: ANESTHESIOLOGY

## 2025-01-06 RX ORDER — GABAPENTIN 300 MG/1
CAPSULE ORAL
Qty: 90 CAPSULE | Refills: 1 | Status: SHIPPED | OUTPATIENT
Start: 2025-01-06

## 2025-01-06 NOTE — PROGRESS NOTES
Assessment  1. Spondylolisthesis, lumbar region    2. Lumbar radiculopathy    3. Lumbar spondylosis    4. Lumbar spondylolysis        Plan  54-year-old male with a history of lumbar spondylosis, spondylolysis at L5, spondylolisthesis at L5-S1, and lumbar radiculopathy returning for interval follow-up.  The patient had a right L5 TFESI November 27, 2024 which gave him approximately 60% of relief which is still ongoing.  He is currently taking gabapentin 300 mg daily as needed with moderate relief.  The patient is satisfied with the amount of relief he currently has.    1.  We will repeat right L5 TFESI as needed  2.  Patient may continue with gabapentin 300 mg daily as needed.  I did explain to him that it is more effective when taken regularly, but he prefers to take this as needed.  Refill was provided  3.  Patient will continue with HEP  4.  I will follow-up with the patient in 3 months or sooner if needed      My impressions and treatment recommendations were discussed in detail with the patient who verbalized understanding and had no further questions.  Discharge instructions were provided. I personally saw and examined the patient and I agree with the above discussed plan of care.    No orders of the defined types were placed in this encounter.    No orders of the defined types were placed in this encounter.      History of Present Illness    Earnest Andre is a 54 y.o. male with a history of lumbar spondylosis, spondylolysis at L5, spondylolisthesis at L5-S1, and lumbar radiculopathy returning for interval follow-up.  The patient complains of lumbosacral back pain that radiates into the right buttock and lateral hip.  This is felt as more of a pressure with occasional numbness and tingling in his right foot.  He rates the pain a 4 out of 10 and the pain is intermittent.  Pain is increased with exercise and alleviated with sitting.  The patient had a right L5 TFESI November 27, 2024 which gave him approximately  60% of relief which is still ongoing.  He is currently taking gabapentin 300 mg daily as needed with moderate relief.  The patient is satisfied with the amount of relief he currently has.    Other than as stated above, the patient denies any interval changes in medications, medical condition, mental condition, symptoms, or allergies since the last office visit.    I have personally reviewed and/or updated the patient's past medical history, past surgical history, family history, social history, current medications, allergies, and vital signs today.     Review of Systems   Constitutional:  Negative for fever and unexpected weight change.   HENT:  Negative for trouble swallowing.    Eyes:  Negative for visual disturbance.   Respiratory:  Negative for shortness of breath and wheezing.    Cardiovascular:  Negative for chest pain and palpitations.   Gastrointestinal:  Negative for constipation, diarrhea, nausea and vomiting.   Endocrine: Negative for cold intolerance, heat intolerance and polydipsia.   Genitourinary:  Negative for difficulty urinating and frequency.   Musculoskeletal:  Negative for arthralgias, gait problem, joint swelling and myalgias.   Skin:  Negative for rash.   Neurological:  Negative for dizziness, seizures, syncope, weakness and headaches.   Hematological:  Does not bruise/bleed easily.   Psychiatric/Behavioral:  Negative for dysphoric mood.    All other systems reviewed and are negative.      Patient Active Problem List   Diagnosis    Acute pain of left knee    Continuous opioid dependence (HCC)    Essential hypertension    Hyperlipidemia    Lumbar radiculopathy    Spondylolisthesis, lumbar region    Lumbar spondylosis       Past Medical History:   Diagnosis Date    Hypertension        Past Surgical History:   Procedure Laterality Date    HERNIA REPAIR         Family History   Problem Relation Age of Onset    COPD Mother     Hypertension Father     Diabetes Father     Lung cancer Father     COPD  "Father     Vision loss Father        Social History     Occupational History    Not on file   Tobacco Use    Smoking status: Never     Passive exposure: Past    Smokeless tobacco: Former     Types: Chew     Quit date: 10/25/2023    Tobacco comments:     Stopped chewing  tobacco 10/23   Vaping Use    Vaping status: Never Used   Substance and Sexual Activity    Alcohol use: Yes     Comment: social    Drug use: Never    Sexual activity: Not on file       Current Outpatient Medications on File Prior to Visit   Medication Sig    ALPRAZolam (XANAX) 1 mg tablet Take 1.5 tablets (1.5 mg total) by mouth daily as needed for anxiety    diclofenac sodium (VOLTAREN) 50 mg EC tablet Take 1 tablet (50 mg total) by mouth 2 (two) times a day    fenofibrate (TRICOR) 145 mg tablet Take 1 tablet (145 mg total) by mouth daily    gabapentin (NEURONTIN) 300 mg capsule Take 1 PO HS x 3 days, then 1 PO BID x 3 days, then 1 PO TID    losartan (COZAAR) 100 MG tablet Take 1 tablet (100 mg total) by mouth daily    methocarbamol (ROBAXIN) 500 mg tablet Take 1 tablet (500 mg total) by mouth 3 (three) times a day as needed for muscle spasms    EPINEPHrine (EpiPen 2-Tim) 0.3 mg/0.3 mL SOAJ Inject 0.3 mL (0.3 mg total) into a muscle once for 1 dose     No current facility-administered medications on file prior to visit.       Allergies   Allergen Reactions    Honey Bee Venom Swelling    Other Cough     Seasonal- cough, sneezing, congestion       Physical Exam    Ht 6' 1\" (1.854 m)   Wt 120 kg (264 lb)   BMI 34.83 kg/m²     Constitutional: normal, well developed, well nourished, alert, in no distress and non-toxic and no overt pain behavior.  Eyes: anicteric  HEENT: grossly intact  Neck: supple, symmetric, trachea midline and no masses   Pulmonary:even and unlabored  Cardiovascular:No edema or pitting edema present  Skin:Normal without rashes or lesions and well hydrated  Psychiatric:Mood and affect appropriate  Neurologic:Cranial Nerves II-XII " grossly intact  Musculoskeletal:normal gait.  Bilateral lumbar paraspinals nontender to palpation.  Bilateral lower extremity strength 5 out of 5 in all muscle groups.  Sensation intact to light touch in L3-S1 dermatomes bilaterally.  Positive seated straight leg raise on the right.    Imaging  MRI LUMBAR SPINE WITHOUT CONTRAST     INDICATION: M54.16: Radiculopathy, lumbar region.   History of bilateral L5 pars defects and grade 1 anterolisthesis without instability on flexion or extension.     COMPARISON: Lumbar spine radiograph 8/21/2024.     TECHNIQUE:  Multiplanar, multisequence imaging of the lumbar spine was performed. .  Imaging performed on 1.5T MRI     IMAGE QUALITY:  Diagnostic     FINDINGS:     VERTEBRAL BODIES:  There are 5 lumbar type vertebral bodies.     Normal alignment of the lumbar spine.     Grade 1 anterolisthesis of L5 on S1 with bilateral pars interarticularis defects at L5.     Type II Modic endplate changes at L5-S1.     No scoliosis. No compression fracture.  No discrete marrow lesion.     SACRUM: Type II Modic changes can be seen within the sacral aspects of the bilateral sacroiliac joints. No evidence of insufficiency or stress fracture.     DISTAL CORD AND CONUS:  Normal size and signal within the distal cord and conus. The conus terminates at the L1 level.  The cauda equina nerve roots appear within normal limits.     PARASPINAL SOFT TISSUES:  Paraspinal soft tissues are unremarkable.     LOWER THORACIC DISC SPACES:  Normal disc height and signal.  No disc herniation, canal stenosis or foraminal narrowing. There is ventral osteophytosis of the visualized thoracic vertebra.     LUMBAR DISC SPACES: Mild disc degeneration and height loss at L4-5. Moderate degeneration and disc height loss at L5-S1.     L1-2: No focal disc herniation.  No central canal or  subarticular/lateral recess narrowing.  No neural foraminal stenosis.     L2-3: Mild diffuse disc bulge. No focal disc herniation.  No  central canal or  subarticular/lateral recess narrowing.  No neural foraminal stenosis.     L3-4: Mild diffuse disc bulge. No focal disc herniation.  No central canal or  subarticular/lateral recess narrowing.  No neural foraminal stenosis.     L4-5: Mild diffuse disc bulge with a focal left paracentral protrusion.  No central canal stenosis. Mild left subarticular/lateral recess narrowing.  No neural foraminal stenosis.     L5-S1: Grade 1 spondylolisthesis with moderate diffuse disc bulge extending into the neural foraminal regions causing moderate to severe bilateral neural foraminal stenosis with encroachment of the exiting L5 nerve roots. There is no significant central   canal stenosis. There is narrowing of the thecal sac secondary to prominence of the epidural fat.     OTHER FINDINGS:  None.     IMPRESSION:     Multilevel lumbar degenerative disc disease as detailed.     Bilateral L5 spondylolysis with grade 1 L5 on S1 spondylolisthesis. There is secondary disc bulge extending into the foraminal regions causing moderate to severe bilateral neural foraminal narrowing and encroachment of the exiting L5 nerve roots.   Correlate with radiculopathy symptoms.

## 2025-01-07 RX ORDER — FENOFIBRATE 145 MG/1
145 TABLET, COATED ORAL DAILY
Qty: 90 TABLET | Refills: 0 | Status: SHIPPED | OUTPATIENT
Start: 2025-01-07

## 2025-01-22 DIAGNOSIS — S39.012D STRAIN OF LUMBAR PARASPINOUS MUSCLE, SUBSEQUENT ENCOUNTER: ICD-10-CM

## 2025-01-22 RX ORDER — METHOCARBAMOL 500 MG/1
500 TABLET, FILM COATED ORAL 3 TIMES DAILY PRN
Qty: 90 TABLET | Refills: 1 | Status: SHIPPED | OUTPATIENT
Start: 2025-01-22

## 2025-03-16 DIAGNOSIS — I10 ESSENTIAL HYPERTENSION: ICD-10-CM

## 2025-03-17 RX ORDER — LOSARTAN POTASSIUM 100 MG/1
100 TABLET ORAL DAILY
Qty: 90 TABLET | Refills: 1 | Status: SHIPPED | OUTPATIENT
Start: 2025-03-17

## 2025-03-20 ENCOUNTER — TELEMEDICINE (OUTPATIENT)
Dept: INTERNAL MEDICINE CLINIC | Age: 55
End: 2025-03-20
Payer: COMMERCIAL

## 2025-03-20 VITALS — BODY MASS INDEX: 34.99 KG/M2 | HEIGHT: 73 IN | WEIGHT: 264 LBS

## 2025-03-20 DIAGNOSIS — R05.1 ACUTE COUGH: ICD-10-CM

## 2025-03-20 DIAGNOSIS — J01.10 ACUTE NON-RECURRENT FRONTAL SINUSITIS: Primary | ICD-10-CM

## 2025-03-20 DIAGNOSIS — J30.1 SEASONAL ALLERGIC RHINITIS DUE TO POLLEN: ICD-10-CM

## 2025-03-20 PROCEDURE — 99213 OFFICE O/P EST LOW 20 MIN: CPT | Performed by: PHYSICIAN ASSISTANT

## 2025-03-20 RX ORDER — FLUTICASONE PROPIONATE 50 MCG
1 SPRAY, SUSPENSION (ML) NASAL DAILY
Qty: 16 G | Refills: 0 | Status: SHIPPED | OUTPATIENT
Start: 2025-03-20

## 2025-03-20 RX ORDER — AZITHROMYCIN 250 MG/1
TABLET, FILM COATED ORAL
Qty: 6 TABLET | Refills: 0 | Status: SHIPPED | OUTPATIENT
Start: 2025-03-20 | End: 2025-03-25

## 2025-03-20 RX ORDER — BENZONATATE 100 MG/1
100 CAPSULE ORAL 3 TIMES DAILY PRN
Qty: 20 CAPSULE | Refills: 0 | Status: SHIPPED | OUTPATIENT
Start: 2025-03-20

## 2025-03-20 NOTE — PROGRESS NOTES
Virtual Regular VisitName: Earnest Andre      : 1970      MRN: [id number]  Encounter Provider: Katheryn Sprague PA-C  Encounter Date: 3/20/2025   Encounter department: [facility]  :  Assessment & Plan  Acute non-recurrent frontal sinusitis  Advised to get antihistamine (claritin or zyrtec) and take daily  Charline if working outside during  months  +flonase  Zpak, with food    Orders:    azithromycin (Zithromax) 250 mg tablet; Take 2 tablets (500 mg total) by mouth daily for 1 day, THEN 1 tablet (250 mg total) daily for 4 days.    fluticasone (FLONASE) 50 mcg/act nasal spray; 1 spray into each nostril daily    Acute cough  If sx persisting despite tx advised to go for CXR  Orders:    benzonatate (TESSALON PERLES) 100 mg capsule; Take 1 capsule (100 mg total) by mouth 3 (three) times a day as needed for cough    XR chest pa and lateral; Future    Seasonal allergic rhinitis due to pollen  +daily antihistamine (otc)  +flonase NS           History of Present Illness     55 y/o male with c/o sinus pressure and pain x 5-6 days, pt reports started with runny nose, had been working outside lately and thought it was allergies, however pt states it has become thick yellow/green and causing pressure in sinuses, reports it seems to have moved to his chest, coughing all night last night and not able to sleep. Pt denies fever, chills, sob, chest pain. Pt did not do home covid test at onset of symptoms. Pt denies ear pain.   Pt reports he has been going about his regular activities, no myalgia or fatigue.        Review of Systems   Constitutional:  Negative for activity change, appetite change, chills, fatigue and fever.   HENT:  Positive for congestion, sinus pressure and sinus pain. Negative for ear pain and sore throat.    Eyes:  Negative for pain and redness.   Respiratory:  Positive for cough. Negative for chest tightness, shortness of breath and wheezing.    Cardiovascular:   "Negative for chest pain and leg swelling.   Gastrointestinal:  Negative for abdominal pain, constipation, diarrhea and nausea.   Genitourinary:  Negative for dysuria and frequency.   Musculoskeletal:  Negative for arthralgias and myalgias.   Skin:  Negative for rash and wound.   Neurological:  Negative for dizziness, light-headedness and headaches.   Psychiatric/Behavioral:  Negative for sleep disturbance. The patient is not nervous/anxious.        Objective   Ht 6' 1\" (1.854 m)   Wt 120 kg (264 lb)   BMI 34.83 kg/m²     Physical Exam  Constitutional:       General: He is not in acute distress.  HENT:      Head: Normocephalic and atraumatic.      Nose: Congestion present.   Pulmonary:      Effort: Pulmonary effort is normal. No respiratory distress.      Breath sounds: No wheezing.   Musculoskeletal:      Cervical back: Normal range of motion. No rigidity.   Neurological:      General: No focal deficit present.      Mental Status: He is alert.   Psychiatric:         Mood and Affect: Mood normal.         Administrative Statements   Encounter provider Katheryn Sprague PA-C    The Patient is located at Home and in the following state in which I hold an active license [place].    The patient was identified by name and date of birth. Earnest Andre was informed that this is a telemedicine visit and that the visit is being conducted through the Epic Embedded platform. He agrees to proceed..  My office door was closed. No one else was in the room.  He acknowledged consent and understanding of privacy and security of the video platform. The patient has agreed to participate and understands they can discontinue the visit at any time.    I have spent a total time of 15 minutes in caring for this patient on the day of the visit/encounter including Documenting in the medical record and Reviewing/placing orders in the medical record (including tests, medications, and/or procedures), not including the time spent for establishing the " audio/video connection.

## 2025-03-31 ENCOUNTER — APPOINTMENT (OUTPATIENT)
Dept: LAB | Facility: CLINIC | Age: 55
End: 2025-03-31
Payer: COMMERCIAL

## 2025-03-31 DIAGNOSIS — I10 ESSENTIAL HYPERTENSION: ICD-10-CM

## 2025-03-31 DIAGNOSIS — E78.2 MIXED HYPERLIPIDEMIA: ICD-10-CM

## 2025-03-31 DIAGNOSIS — E78.1 HYPERTRIGLYCERIDEMIA: ICD-10-CM

## 2025-03-31 LAB
ALBUMIN SERPL BCG-MCNC: 4.4 G/DL (ref 3.5–5)
ALP SERPL-CCNC: 61 U/L (ref 34–104)
ALT SERPL W P-5'-P-CCNC: 26 U/L (ref 7–52)
ANION GAP SERPL CALCULATED.3IONS-SCNC: 7 MMOL/L (ref 4–13)
AST SERPL W P-5'-P-CCNC: 28 U/L (ref 13–39)
BILIRUB SERPL-MCNC: 0.45 MG/DL (ref 0.2–1)
BUN SERPL-MCNC: 17 MG/DL (ref 5–25)
CALCIUM SERPL-MCNC: 9.7 MG/DL (ref 8.4–10.2)
CHLORIDE SERPL-SCNC: 104 MMOL/L (ref 96–108)
CO2 SERPL-SCNC: 29 MMOL/L (ref 21–32)
CREAT SERPL-MCNC: 1.11 MG/DL (ref 0.6–1.3)
EST. AVERAGE GLUCOSE BLD GHB EST-MCNC: 114 MG/DL
GFR SERPL CREATININE-BSD FRML MDRD: 75 ML/MIN/1.73SQ M
GLUCOSE P FAST SERPL-MCNC: 115 MG/DL (ref 65–99)
HBA1C MFR BLD: 5.6 %
POTASSIUM SERPL-SCNC: 4.2 MMOL/L (ref 3.5–5.3)
PROT SERPL-MCNC: 7.2 G/DL (ref 6.4–8.4)
SODIUM SERPL-SCNC: 140 MMOL/L (ref 135–147)

## 2025-03-31 PROCEDURE — 80053 COMPREHEN METABOLIC PANEL: CPT

## 2025-03-31 PROCEDURE — 36415 COLL VENOUS BLD VENIPUNCTURE: CPT

## 2025-04-03 ENCOUNTER — OFFICE VISIT (OUTPATIENT)
Dept: INTERNAL MEDICINE CLINIC | Age: 55
End: 2025-04-03
Payer: COMMERCIAL

## 2025-04-03 VITALS
OXYGEN SATURATION: 98 % | TEMPERATURE: 97.6 F | BODY MASS INDEX: 35.15 KG/M2 | WEIGHT: 265.2 LBS | HEART RATE: 89 BPM | SYSTOLIC BLOOD PRESSURE: 136 MMHG | DIASTOLIC BLOOD PRESSURE: 80 MMHG | HEIGHT: 73 IN

## 2025-04-03 DIAGNOSIS — Z12.11 ENCOUNTER FOR SCREENING FOR MALIGNANT NEOPLASM OF COLON: Primary | ICD-10-CM

## 2025-04-03 DIAGNOSIS — E78.2 MIXED HYPERLIPIDEMIA: ICD-10-CM

## 2025-04-03 DIAGNOSIS — I10 ESSENTIAL HYPERTENSION: ICD-10-CM

## 2025-04-03 DIAGNOSIS — E78.1 HYPERTRIGLYCERIDEMIA: ICD-10-CM

## 2025-04-03 DIAGNOSIS — L98.9 SKIN LESION: ICD-10-CM

## 2025-04-03 DIAGNOSIS — Z12.5 SCREENING FOR PROSTATE CANCER: ICD-10-CM

## 2025-04-03 DIAGNOSIS — L40.9 PSORIASIS: ICD-10-CM

## 2025-04-03 DIAGNOSIS — Z00.00 ANNUAL PHYSICAL EXAM: ICD-10-CM

## 2025-04-03 PROCEDURE — 99214 OFFICE O/P EST MOD 30 MIN: CPT | Performed by: PHYSICIAN ASSISTANT

## 2025-04-03 PROCEDURE — 99396 PREV VISIT EST AGE 40-64: CPT | Performed by: PHYSICIAN ASSISTANT

## 2025-04-03 RX ORDER — CLOBETASOL PROPIONATE 0.5 MG/G
OINTMENT TOPICAL 2 TIMES DAILY
Qty: 45 G | Refills: 1 | Status: SHIPPED | OUTPATIENT
Start: 2025-04-03

## 2025-04-03 NOTE — ASSESSMENT & PLAN NOTE
During initial intake blood pressure was quite elevated however after reevaluation using a new cuff it was determined there was an issue with our sphygmomanometer and therefore his vital report was changed  Controlled, continue losartan  Orders:    CBC and differential; Future    Comprehensive metabolic panel; Future    Lipid panel; Future    TSH, 3rd generation; Future

## 2025-04-03 NOTE — PROGRESS NOTES
Adult Annual Physical  Name: Earnest Andre      : 1970      MRN: [id number]  Encounter Provider: Katheryn Sprague PA-C  Encounter Date: 4/3/2025   Encounter department: [facility]    :  Assessment & Plan  Encounter for screening for malignant neoplasm of colon  Discussed colon cancer screening methods recommend for colonoscopy versus Cologuard patient is agreeable to set this up  Orders:    Ambulatory Referral to Gastroenterology; Future    Psoriasis  Patient reports family history of psoriasis, small lesion on forearm will treat topically and follow-up with dermatology regarding this  Orders:    clobetasol (TEMOVATE) 0.05 % ointment; Apply topically 2 (two) times a day    Ambulatory Referral to Dermatology; Future    Skin lesion  Changing lesion on abdomen increasing in size-patient to establish with dermatology  Orders:    Ambulatory Referral to Dermatology; Future    TSH, 3rd generation; Future    Annual physical exam    Orders:    CBC and differential; Future    Comprehensive metabolic panel; Future    Lipid panel; Future    TSH, 3rd generation; Future    Screening for prostate cancer    Orders:    PSA, Total Screen; Future    Essential hypertension  During initial intake blood pressure was quite elevated however after reevaluation using a new cuff it was determined there was an issue with our sphygmomanometer and therefore his vital report was changed  Controlled, continue losartan  Orders:    CBC and differential; Future    Comprehensive metabolic panel; Future    Lipid panel; Future    TSH, 3rd generation; Future    Hypertriglyceridemia  Improved, continue low-carb low-cholesterol diet and fenofibrate  Orders:    CBC and differential; Future    Comprehensive metabolic panel; Future    Lipid panel; Future    TSH, 3rd generation; Future    Mixed hyperlipidemia    Orders:    CBC and differential; Future    Comprehensive metabolic panel; Future    Lipid panel; Future     TSH, 3rd generation; Future        Preventive Screenings:  - Diabetes Screening: screening up-to-date  - Cholesterol Screening: screening not indicated and has hyperlipidemia   - Hepatitis C screening: screening up-to-date   - Colon cancer screening: risks/benefits discussed and orders placed   - Lung cancer screening: screening not indicated   - Prostate cancer screening: risks/benefits discussed          History of Present Illness     Adult Annual Physical:  Patient presents for annual physical. 53 y/o male with hx of HTN, HLD, impaired fbs presents for f/u and annual exam  .     Diet and Physical Activity:  - Diet/Nutrition: well balanced diet and consuming 3-5 servings of fruits/vegetables daily.  - Exercise: walking. physical job - no formal exercise    General Health:  - Sleep:. 6-7 hours sleep / night  - Hearing: normal hearing bilateral ears.  - Vision: wears contacts and most recent eye exam < 1 year ago.  - Dental: regular dental visits.     Health:  - History of STDs: no.   - Urinary symptoms: none.     Review of Systems   Constitutional:  Negative for activity change, appetite change, chills, diaphoresis, fatigue and fever.   HENT:  Negative for congestion and sore throat.    Eyes:  Negative for pain and redness.   Respiratory:  Negative for cough and shortness of breath.    Cardiovascular:  Negative for chest pain and leg swelling.   Gastrointestinal:  Negative for abdominal pain, blood in stool, constipation, diarrhea and nausea.   Endocrine: Negative for cold intolerance.   Genitourinary:  Negative for dysuria and frequency.   Musculoskeletal:  Negative for arthralgias and back pain.   Skin:  Negative for rash.   Allergic/Immunologic: Negative for environmental allergies and food allergies.   Neurological:  Negative for dizziness, light-headedness and headaches.   Hematological:  Negative for adenopathy. Does not bruise/bleed easily.   Psychiatric/Behavioral:  Negative for sleep disturbance. The  "patient is not nervous/anxious.          Objective   BP (!) 150/110 (BP Location: Left arm, Patient Position: Sitting, Cuff Size: Adult)   Pulse 89   Temp 97.6 °F (36.4 °C) (Temporal)   Ht 6' 1\" (1.854 m)   Wt 120 kg (265 lb 3.2 oz)   SpO2 98%   BMI 34.99 kg/m²     Physical Exam  Vitals reviewed.   Constitutional:       General: He is not in acute distress.  HENT:      Head: Normocephalic and atraumatic.      Right Ear: Tympanic membrane, ear canal and external ear normal. There is no impacted cerumen.      Left Ear: Tympanic membrane, ear canal and external ear normal. There is no impacted cerumen.      Nose: Nose normal.      Mouth/Throat:      Mouth: Mucous membranes are moist.   Eyes:      General:         Right eye: No discharge.         Left eye: No discharge.      Extraocular Movements: Extraocular movements intact.      Conjunctiva/sclera: Conjunctivae normal.      Pupils: Pupils are equal, round, and reactive to light.   Cardiovascular:      Rate and Rhythm: Normal rate and regular rhythm.      Heart sounds: No murmur heard.  Pulmonary:      Effort: Pulmonary effort is normal. No respiratory distress.      Breath sounds: Normal breath sounds. No wheezing.   Abdominal:      General: Bowel sounds are normal. There is no distension.   Musculoskeletal:         General: Normal range of motion.      Cervical back: Normal range of motion. No rigidity.      Right lower leg: No edema.      Left lower leg: No edema.   Lymphadenopathy:      Cervical: No cervical adenopathy.   Skin:     General: Skin is warm.      Findings: No erythema or rash.   Neurological:      General: No focal deficit present.      Mental Status: He is alert and oriented to person, place, and time.      Cranial Nerves: No cranial nerve deficit.      Motor: No weakness.   Psychiatric:         Mood and Affect: Mood normal.         "

## 2025-04-10 NOTE — PROGRESS NOTES
Assessment  1. Spondylolisthesis, lumbar region    2. Lumbar spondylosis    3. Lumbar radiculopathy    4. Lumbar spondylolysis        Plan  Patient is a 54-year-old male returns for follow-up after his office visit in January 2025.  Patient states that he continues to have numbness throughout his right lower extremity. Patient states that he no longer has low back and lower extremity pain and states that he is taking gabapentin occasionally and does not take it regularly.  Given that patient does not have associated pain but only numbness, patient does not require interventional procedure at this time.  Patient will require taking Gabapentin on a regular basis to take effect.     1.  Recommended patient take Gabapentin 300 mg QHS. Does not require refill at this time.  2. Recommended patient go to chiropractic services Dr. Hopkins  3.  If patient develops back pain, leg pain, or worsening symptoms, recommended to call back and we will schedule right L5 TFESI.  4. Recommended follow up in 6 months.     My impressions and treatment recommendations were discussed in detail with the patient who verbalized understanding and had no further questions.  Discharge instructions were provided. I personally saw and examined the patient and I agree with the above discussed plan of care.    No orders of the defined types were placed in this encounter.    No orders of the defined types were placed in this encounter.      History of Present Illness    Earnest Andre is a 54 y.o. male returns for follow-up after his office visit in January 2095.  Patient states that he no longer has pain to his low back and leg symptoms but continues to have numbness throughout his right lower extremity.  Patient states that the symptoms sometimes interfere with activities of daily living and has not been doing any conservative treatment including physical therapy at this time.  Patient states that he occasionally takes gabapentin but does not take it  regularly.    I have personally reviewed and/or updated the patient's past medical history, past surgical history, family history, social history, current medications, allergies, and vital signs today.     Review of Systems   Constitutional:  Negative for fever and unexpected weight change.   HENT:  Negative for trouble swallowing.    Eyes:  Negative for visual disturbance.   Respiratory:  Negative for shortness of breath and wheezing.    Cardiovascular:  Negative for chest pain and palpitations.   Gastrointestinal:  Negative for constipation, diarrhea, nausea and vomiting.   Endocrine: Negative for cold intolerance, heat intolerance and polydipsia.   Genitourinary:  Negative for difficulty urinating and frequency.   Musculoskeletal:  Negative for arthralgias, gait problem, joint swelling and myalgias.   Skin:  Negative for rash.   Neurological:  Negative for dizziness, seizures, syncope, weakness and headaches.   Hematological:  Does not bruise/bleed easily.   Psychiatric/Behavioral:  Negative for dysphoric mood.    All other systems reviewed and are negative.  General: no fevers, chills, infections  Neuro: no saddle anesthesia, no dropping objects or balance issues  GI: no changes in bowel habits  : no changes in bladder habits  Hem: no bleeding      Patient Active Problem List   Diagnosis    Acute pain of left knee    Continuous opioid dependence (HCC)    Essential hypertension    Hyperlipidemia    Lumbar radiculopathy    Spondylolisthesis, lumbar region    Lumbar spondylosis    Lumbar spondylolysis       Past Medical History:   Diagnosis Date    Hypertension        Past Surgical History:   Procedure Laterality Date    HERNIA REPAIR         Family History   Problem Relation Age of Onset    COPD Mother     Hypertension Father     Diabetes Father     Lung cancer Father     COPD Father     Vision loss Father        Social History     Occupational History    Not on file   Tobacco Use    Smoking status: Never      Passive exposure: Past    Smokeless tobacco: Former     Types: Chew     Quit date: 10/25/2023    Tobacco comments:     Stopped chewing  tobacco 10/23   Vaping Use    Vaping status: Never Used   Substance and Sexual Activity    Alcohol use: Yes     Comment: social    Drug use: Never    Sexual activity: Not Currently       Current Outpatient Medications on File Prior to Visit   Medication Sig    ALPRAZolam (XANAX) 1 mg tablet Take 1.5 tablets (1.5 mg total) by mouth daily as needed for anxiety    benzonatate (TESSALON PERLES) 100 mg capsule Take 1 capsule (100 mg total) by mouth 3 (three) times a day as needed for cough    losartan (COZAAR) 100 MG tablet Take 1 tablet (100 mg total) by mouth daily    methocarbamol (ROBAXIN) 500 mg tablet Take 1 tablet (500 mg total) by mouth 3 (three) times a day as needed for muscle spasms    clobetasol (TEMOVATE) 0.05 % ointment Apply topically 2 (two) times a day    diclofenac sodium (VOLTAREN) 50 mg EC tablet Take 1 tablet (50 mg total) by mouth 2 (two) times a day (Patient taking differently: Take 50 mg by mouth 2 (two) times a day as needed)    EPINEPHrine (EpiPen 2-Tim) 0.3 mg/0.3 mL SOAJ Inject 0.3 mL (0.3 mg total) into a muscle once for 1 dose    fenofibrate (TRICOR) 145 mg tablet Take 1 tablet (145 mg total) by mouth daily    fluticasone (FLONASE) 50 mcg/act nasal spray 1 spray into each nostril daily (Patient taking differently: 1 spray into each nostril daily as needed)    gabapentin (NEURONTIN) 300 mg capsule Take 1 PO HS x 3 days, then 1 PO BID x 3 days, then 1 PO TID (Patient taking differently: Take 1 PO HS x 3 days, then 1 PO BID x 3 days, then 1 PO TID prn)     No current facility-administered medications on file prior to visit.       Allergies   Allergen Reactions    Honey Bee Venom Swelling    Other Cough     Seasonal- cough, sneezing, congestion       Physical Exam    Wt 122 kg (268 lb)   BMI 35.36 kg/m²     There were no vitals filed for this  visit.  Constitutional: no apparent distress, does not appear sedated   HEENT: pupils equal and round, symmetric facial muscles   Neck: supple  Pulmonary: good chest wall excursion, breathing unlabored   Psych: appropriate affect and insight. no evidence of aberrant behavior   Neuro: cranial nerves II-XII grossly intact    MSK:   5/5 strength in B/L LE  Sensation intact to light B/L LE   Gait: ambulates unassisted, gait is not antalgic          Imaging

## 2025-04-14 ENCOUNTER — OFFICE VISIT (OUTPATIENT)
Dept: PAIN MEDICINE | Facility: CLINIC | Age: 55
End: 2025-04-14
Payer: COMMERCIAL

## 2025-04-14 VITALS — BODY MASS INDEX: 35.36 KG/M2 | WEIGHT: 268 LBS

## 2025-04-14 DIAGNOSIS — M43.16 SPONDYLOLISTHESIS, LUMBAR REGION: Primary | ICD-10-CM

## 2025-04-14 DIAGNOSIS — M43.06 LUMBAR SPONDYLOLYSIS: ICD-10-CM

## 2025-04-14 DIAGNOSIS — M54.16 LUMBAR RADICULOPATHY: ICD-10-CM

## 2025-04-14 DIAGNOSIS — M47.816 LUMBAR SPONDYLOSIS: ICD-10-CM

## 2025-04-14 PROCEDURE — 99214 OFFICE O/P EST MOD 30 MIN: CPT | Performed by: ANESTHESIOLOGY

## 2025-05-14 DIAGNOSIS — I10 ESSENTIAL HYPERTENSION: ICD-10-CM

## 2025-05-14 RX ORDER — LOSARTAN POTASSIUM 100 MG/1
100 TABLET ORAL DAILY
Qty: 90 TABLET | Refills: 1 | Status: SHIPPED | OUTPATIENT
Start: 2025-05-14

## 2025-05-20 DIAGNOSIS — Z91.030 BEE STING ALLERGY: ICD-10-CM

## 2025-05-20 DIAGNOSIS — E78.1 HYPERTRIGLYCERIDEMIA: ICD-10-CM

## 2025-05-20 RX ORDER — FENOFIBRATE 145 MG/1
145 TABLET, FILM COATED ORAL DAILY
Qty: 90 TABLET | Refills: 1 | Status: SHIPPED | OUTPATIENT
Start: 2025-05-20

## 2025-05-20 RX ORDER — EPINEPHRINE 0.3 MG/.3ML
0.3 INJECTION SUBCUTANEOUS ONCE
Qty: 0.6 ML | Refills: 1 | Status: SHIPPED | OUTPATIENT
Start: 2025-05-20 | End: 2025-05-20

## 2025-07-16 ENCOUNTER — TELEPHONE (OUTPATIENT)
Dept: DERMATOLOGY | Facility: CLINIC | Age: 55
End: 2025-07-16

## 2025-07-16 NOTE — TELEPHONE ENCOUNTER
Called patient to advise their appointment with Lisbeth on 10/13 needs to be rescheduled due to a change in provider's schedule. Left a message that appt has been rescheduled for 9/25 @ 9:00 with Dr. Maddy Menchaca. Advised patient to call the office to confirm new appointment & location.